# Patient Record
Sex: FEMALE | Race: WHITE | NOT HISPANIC OR LATINO | Employment: STUDENT | URBAN - METROPOLITAN AREA
[De-identification: names, ages, dates, MRNs, and addresses within clinical notes are randomized per-mention and may not be internally consistent; named-entity substitution may affect disease eponyms.]

---
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
## 2021-01-28 ENCOUNTER — TELEMEDICINE (OUTPATIENT)
Dept: FAMILY MEDICINE CLINIC | Facility: CLINIC | Age: 21
End: 2021-01-28

## 2021-01-28 DIAGNOSIS — F43.21 ADJUSTMENT DISORDER WITH DEPRESSED MOOD: ICD-10-CM

## 2021-01-28 DIAGNOSIS — F41.1 GENERALIZED ANXIETY DISORDER: Primary | ICD-10-CM

## 2021-01-28 PROCEDURE — 99214 OFFICE O/P EST MOD 30 MIN: CPT | Performed by: FAMILY MEDICINE

## 2021-01-28 RX ORDER — OMALIZUMAB 150 MG/ML
INJECTION, SOLUTION SUBCUTANEOUS
COMMUNITY
Start: 2021-01-25 | End: 2023-03-29

## 2021-01-28 RX ORDER — VENLAFAXINE HYDROCHLORIDE 75 MG/1
75 CAPSULE, EXTENDED RELEASE ORAL DAILY
Qty: 30 CAPSULE | Refills: 1 | Status: SHIPPED | OUTPATIENT
Start: 2021-01-28 | End: 2021-02-19 | Stop reason: SDUPTHER

## 2021-02-19 DIAGNOSIS — F43.21 ADJUSTMENT DISORDER WITH DEPRESSED MOOD: ICD-10-CM

## 2021-02-19 DIAGNOSIS — F41.1 GENERALIZED ANXIETY DISORDER: ICD-10-CM

## 2021-02-19 RX ORDER — VENLAFAXINE HYDROCHLORIDE 75 MG/1
75 CAPSULE, EXTENDED RELEASE ORAL DAILY
Qty: 30 CAPSULE | Refills: 1 | Status: SHIPPED | OUTPATIENT
Start: 2021-02-19 | End: 2021-02-24 | Stop reason: DRUGHIGH

## 2021-02-24 ENCOUNTER — TELEMEDICINE (OUTPATIENT)
Dept: FAMILY MEDICINE CLINIC | Facility: CLINIC | Age: 21
End: 2021-02-24

## 2021-02-24 DIAGNOSIS — F41.1 GENERALIZED ANXIETY DISORDER: Primary | ICD-10-CM

## 2021-02-24 DIAGNOSIS — F43.21 ADJUSTMENT DISORDER WITH DEPRESSED MOOD: ICD-10-CM

## 2021-02-24 PROCEDURE — 99213 OFFICE O/P EST LOW 20 MIN: CPT | Performed by: FAMILY MEDICINE

## 2021-02-24 RX ORDER — VENLAFAXINE HYDROCHLORIDE 150 MG/1
150 TABLET, EXTENDED RELEASE ORAL DAILY
Qty: 30 EACH | Refills: 1 | Status: SHIPPED | OUTPATIENT
Start: 2021-02-24 | End: 2021-03-22 | Stop reason: SDUPTHER

## 2021-02-24 NOTE — PROGRESS NOTES
This was an audio and video enabled telemedicine encounter.  Length of visit 12 minutes.    Chief Complaint  Med Refill    Subjective          Dania Holliday presents to [facility]  Presents for video visit to follow-up on her anxiety.  She feels that her mood is better on the Effexor.  She feels less blunted.  She denies any side effects.  She may be a little more down but certainly is less anxious.  She denies any suicidal or homicidal ideation.  She denies any hopelessness.  She is able to focus on all her schoolwork.         Objective   Vital Signs:   There were no vitals taken for this visit.    Physical Exam  Vitals signs and nursing note reviewed.   Constitutional:       General: She is not in acute distress.  HENT:      Head: Normocephalic and atraumatic.      Right Ear: External ear normal.      Left Ear: External ear normal.   Eyes:      Conjunctiva/sclera: Conjunctivae normal.      Pupils: Pupils are equal, round, and reactive to light.   Pulmonary:      Effort: No respiratory distress.   Neurological:      Mental Status: She is alert and oriented to person, place, and time.   Psychiatric:         Mood and Affect: Mood normal.         Behavior: Behavior normal.        Result Review :                 Assessment and Plan    Diagnoses and all orders for this visit:    1. Generalized anxiety disorder (Primary)  -     venlafaxine (EFFEXOR) 150 MG tablet sustained-release 24 hour 24 hr tablet; Take 1 tablet by mouth Daily.  Dispense: 30 each; Refill: 1    2. Adjustment disorder with depressed mood  -     venlafaxine (EFFEXOR) 150 MG tablet sustained-release 24 hour 24 hr tablet; Take 1 tablet by mouth Daily.  Dispense: 30 each; Refill: 1        Follow Up   Return in about 2 months (around 4/24/2021) for Recheck mood .  Patient was given instructions and counseling regarding her condition or for health maintenance advice. Please see specific information pulled into the AVS if  appropriate.

## 2021-03-22 DIAGNOSIS — F41.1 GENERALIZED ANXIETY DISORDER: ICD-10-CM

## 2021-03-22 DIAGNOSIS — F43.21 ADJUSTMENT DISORDER WITH DEPRESSED MOOD: ICD-10-CM

## 2021-03-22 RX ORDER — VENLAFAXINE HYDROCHLORIDE 150 MG/1
150 TABLET, EXTENDED RELEASE ORAL DAILY
Qty: 90 EACH | Refills: 1 | Status: SHIPPED | OUTPATIENT
Start: 2021-03-22 | End: 2021-03-29 | Stop reason: SDUPTHER

## 2021-03-29 ENCOUNTER — TELEPHONE (OUTPATIENT)
Dept: FAMILY MEDICINE CLINIC | Facility: CLINIC | Age: 21
End: 2021-03-29

## 2021-03-29 DIAGNOSIS — F41.1 GENERALIZED ANXIETY DISORDER: Primary | ICD-10-CM

## 2021-03-29 RX ORDER — VENLAFAXINE HYDROCHLORIDE 150 MG/1
150 CAPSULE, EXTENDED RELEASE ORAL DAILY
Qty: 90 CAPSULE | Refills: 0 | Status: SHIPPED | OUTPATIENT
Start: 2021-03-29 | End: 2021-06-02

## 2021-03-29 NOTE — TELEPHONE ENCOUNTER
Caller: Dania Holliday    Relationship to patient: Self    Best call back number: [phone]    Patient is needing: PATIENT IS WANTING DR. KEHRER TO SEND IN CAPSULE FORM OF EFFEXOR AS THE PILL FORM IS TOO EXPENSIVE. NEW PRESCRIPTION IS REQUIRED FOR CAPSULES    Preferred Pharmacy: [facility]/pharmacy #[id number] - [place], [place] - [address] [phone] [facility] [phone]   [phone]

## 2021-06-02 ENCOUNTER — OFFICE VISIT (OUTPATIENT)
Dept: FAMILY MEDICINE CLINIC | Facility: CLINIC | Age: 21
End: 2021-06-02

## 2021-06-02 VITALS
WEIGHT: 132.6 LBS | HEART RATE: 93 BPM | BODY MASS INDEX: 22.09 KG/M2 | OXYGEN SATURATION: 99 % | SYSTOLIC BLOOD PRESSURE: 120 MMHG | HEIGHT: 65 IN | TEMPERATURE: 97.3 F | DIASTOLIC BLOOD PRESSURE: 80 MMHG

## 2021-06-02 DIAGNOSIS — F41.1 GENERALIZED ANXIETY DISORDER: Chronic | ICD-10-CM

## 2021-06-02 DIAGNOSIS — Z00.00 ROUTINE HEALTH MAINTENANCE: Primary | ICD-10-CM

## 2021-06-02 PROCEDURE — 99395 PREV VISIT EST AGE 18-39: CPT | Performed by: FAMILY MEDICINE

## 2021-06-02 PROCEDURE — 99213 OFFICE O/P EST LOW 20 MIN: CPT | Performed by: FAMILY MEDICINE

## 2021-06-02 RX ORDER — FLUOXETINE HYDROCHLORIDE 20 MG/1
20 CAPSULE ORAL DAILY
Qty: 30 CAPSULE | Refills: 1 | Status: SHIPPED | OUTPATIENT
Start: 2021-06-02 | End: 2021-06-02

## 2021-06-02 RX ORDER — FLUOXETINE HYDROCHLORIDE 20 MG/1
20 CAPSULE ORAL DAILY
Qty: 30 CAPSULE | Refills: 1 | Status: SHIPPED | OUTPATIENT
Start: 2021-06-02 | End: 2021-07-06 | Stop reason: SDUPTHER

## 2021-06-02 NOTE — PATIENT INSTRUCTIONS
Try to switch to the fluoxetine and stop the venlafaxine when you  the fluoxetine.  If you cannot just make a switch go back on the venlafaxine, and contact me for a lower dose of it to taper.  Strongly consider HPV vaccine series and call the office back to get set up if you decide to  Pap smear start at age 21 and every 3 years if normal.

## 2021-06-02 NOTE — PROGRESS NOTES
Subjective   Dania Holliday is a 20 y.o. female who presents for annual female wellness exam.  Chief Complaint   Patient presents with   • Annual Exam   • Gynecologic Exam   Patient presents for her annual.  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  She is not involved in a romantic Cervilenzation ship but is talking to an older gentleman.  She feels that he is responsible enough to be honest with her about his history and would use protection.  We discussed safe sex practices.  We went over the Gardasil vaccine and patient is still hesitant.  As far as her anxiety, she does not think the venlafaxine helped her as much as the Lexapro but would like to try a different SSRI.  If she takes the venlafaxine late she feels funny.  She denies any panic attacks recently.  She denies any suicidal or homicidal ideation.  Patient does use occasional marijuana.  She does not use any other drugs.  She has rarely consumed alcohol.     Menstrual History: regular  Pregnancy History: G0  Sexual History: not yet   Contraception: na  Hormone Replacement Therapy: na  Diet: vegan, mostly  Exercise: going to gym 5 days a week  Do you feel safe? yes  Have you ever been abused? no    Mammogram: na  Pap Smear: na  Bone Density: na  Colon Cancer Screening: na    Immunization History   Administered Date(s) Administered   • COVID-19 (PFIZER) 01/07/2021, 01/28/2021   • DTaP 5 01/15/2002, 02/26/2002, 04/23/2002, 10/29/2003, 11/08/2005   • Flu Vaccine Split Quad 11/30/2019   • Flulaval/Fluarix/Fluzone Quad 11/30/2019   • Hep A, 2 Dose 01/29/2018, 09/06/2018   • Hep B, Adolescent or Pediatric 2001, 2001, 08/20/2002   • HiB 01/15/2002, 02/26/2002, 04/23/2002, 10/29/2003   • IPV 01/15/2002, 02/26/2002, 01/28/2003, 11/05/2005   • MMR 01/28/2003, 11/08/2005   • Meningococcal B,(Bexsero) 08/17/2020   • Meningococcal MCV4P (Menactra) 10/29/2002, 09/06/2018   • Tdap 10/29/2012   • Varicella 10/29/2002, 05/10/2007       The  following portions of the patient's history were reviewed and updated as appropriate: allergies, current medications, past family history, past medical history, past social history, past surgical history and problem list.    Past Medical History:   Diagnosis Date   • Abscess     HIstory of    • Anxiety    • Consumes a vegan diet     On Vegan or strict vegeterian diet--Abstracted from [employer]   • History of attention deficit disorder    • History of cardiac arrhythmia    • History of ear infections    • Iron deficiency anemia    • Underweight     History of        History reviewed. No pertinent surgical history.    Family History   Problem Relation Age of Onset   • ADD / ADHD Mother    • Migraines Mother    • ADD / ADHD Father    • Anxiety disorder Sister    • Migraines Sister    • No Known Problems Other        Social History     Socioeconomic History   • Marital status: Single     Spouse name: Not on file   • Number of children: Not on file   • Years of education: Not on file   • Highest education level: Not on file   Tobacco Use   • Smoking status: Never Smoker   • Smokeless tobacco: Never Used   Substance and Sexual Activity   • Alcohol use: Defer   • Drug use: Defer         Current Outpatient Medications:   •  Xolair 150 MG/ML solution prefilled syringe injection, , Disp: , Rfl:   •  FLUoxetine (PROzac) 20 MG capsule, Take 1 capsule by mouth Daily., Disp: 30 capsule, Rfl: 1    Review of Systems   Constitutional: Negative for chills, fatigue and fever.   HENT: Negative for congestion, ear pain, rhinorrhea and sore throat.    Eyes: Negative for pain and redness.   Respiratory: Negative for cough, chest tightness and wheezing.    Cardiovascular: Negative for chest pain and leg swelling.   Gastrointestinal: Negative for abdominal pain, constipation, diarrhea, nausea and vomiting.   Endocrine: Negative for polydipsia and polyphagia.   Genitourinary: Negative for dysuria and hematuria.   Musculoskeletal: Negative for  "arthralgias and myalgias.   Skin: Negative for color change and rash.   Allergic/Immunologic: Negative.    Neurological: Negative for dizziness, weakness, light-headedness and headaches.   Hematological: Negative.    Psychiatric/Behavioral: Negative for confusion, dysphoric mood and sleep disturbance.       Objective   Vitals:    06/02/21 1448   BP: 120/80   Pulse: 93   Temp: 97.3 °F (36.3 °C)   SpO2: 99%   Weight: 60.1 kg (132 lb 9.6 oz)   Height: 165.1 cm (65\")     Body mass index is 22.07 kg/m².  Physical Exam  Vitals and nursing note reviewed.   Constitutional:       General: She is not in acute distress.     Appearance: Normal appearance. She is well-developed.   HENT:      Head: Normocephalic and atraumatic.      Right Ear: Tympanic membrane, ear canal and external ear normal.      Left Ear: Tympanic membrane, ear canal and external ear normal.      Nose: Nose normal.      Mouth/Throat:      Mouth: Mucous membranes are moist.      Pharynx: Oropharynx is clear. No oropharyngeal exudate or posterior oropharyngeal erythema.   Eyes:      Conjunctiva/sclera: Conjunctivae normal.      Pupils: Pupils are equal, round, and reactive to light.   Neck:      Thyroid: No thyromegaly.   Cardiovascular:      Rate and Rhythm: Normal rate and regular rhythm.      Heart sounds: No murmur heard.     Pulmonary:      Effort: Pulmonary effort is normal.      Breath sounds: Normal breath sounds. No wheezing.   Abdominal:      General: Abdomen is flat. Bowel sounds are normal. There is no distension.      Palpations: Abdomen is soft. There is no mass.      Tenderness: There is no abdominal tenderness.      Hernia: No hernia is present.   Musculoskeletal:         General: No swelling. Normal range of motion.      Cervical back: Normal range of motion and neck supple.      Right lower leg: No edema.      Left lower leg: No edema.   Lymphadenopathy:      Cervical: No cervical adenopathy.   Skin:     General: Skin is warm and dry.      " Capillary Refill: Capillary refill takes less than 2 seconds.      Findings: No rash.   Neurological:      General: No focal deficit present.      Mental Status: She is alert and oriented to person, place, and time.      Cranial Nerves: No cranial nerve deficit.   Psychiatric:         Mood and Affect: Mood normal.         Behavior: Behavior normal.           Assessment/Plan   Diagnoses and all orders for this visit:    1. Routine health maintenance (Primary)    2. Generalized anxiety disorder  -     Discontinue: FLUoxetine (PROzac) 20 MG capsule; Take 1 capsule by mouth Daily.  Dispense: 30 capsule; Refill: 1  -     FLUoxetine (PROzac) 20 MG capsule; Take 1 capsule by mouth Daily.  Dispense: 30 capsule; Refill: 1               Discussed the importance of maintaining a healthy weight and getting regular exercise.  Educated patient on the benefits of healthy diet.  Advise follow-up annually for wellness exams.    Patient Instructions   Try to switch to the fluoxetine and stop the venlafaxine when you  the fluoxetine.  If you cannot just make a switch go back on the venlafaxine, and contact me for a lower dose of it to taper.  Strongly consider HPV vaccine series and call the office back to get set up if you decide to  Pap smear start at age 21 and every 3 years if normal.                Answers for HPI/ROS submitted by the patient on 6/2/2021  What is the primary reason for your visit?: Physical

## 2021-06-20 DIAGNOSIS — F41.1 GENERALIZED ANXIETY DISORDER: ICD-10-CM

## 2021-06-21 RX ORDER — VENLAFAXINE HYDROCHLORIDE 150 MG/1
CAPSULE, EXTENDED RELEASE ORAL
Qty: 90 CAPSULE | Refills: 0 | OUTPATIENT
Start: 2021-06-21

## 2021-07-06 DIAGNOSIS — F41.1 GENERALIZED ANXIETY DISORDER: Chronic | ICD-10-CM

## 2021-07-06 RX ORDER — FLUOXETINE HYDROCHLORIDE 20 MG/1
20 CAPSULE ORAL DAILY
Qty: 90 CAPSULE | Refills: 0 | Status: SHIPPED | OUTPATIENT
Start: 2021-07-06 | End: 2021-08-05 | Stop reason: SDUPTHER

## 2021-08-05 ENCOUNTER — OFFICE VISIT (OUTPATIENT)
Dept: FAMILY MEDICINE CLINIC | Facility: CLINIC | Age: 21
End: 2021-08-05

## 2021-08-05 VITALS
OXYGEN SATURATION: 99 % | BODY MASS INDEX: 21.86 KG/M2 | TEMPERATURE: 98.9 F | DIASTOLIC BLOOD PRESSURE: 62 MMHG | SYSTOLIC BLOOD PRESSURE: 116 MMHG | WEIGHT: 131.2 LBS | HEART RATE: 82 BPM | HEIGHT: 65 IN

## 2021-08-05 DIAGNOSIS — F41.1 GENERALIZED ANXIETY DISORDER: Primary | ICD-10-CM

## 2021-08-05 DIAGNOSIS — F43.21 ADJUSTMENT DISORDER WITH DEPRESSED MOOD: ICD-10-CM

## 2021-08-05 DIAGNOSIS — Z78.9 VEGETARIAN DIET: ICD-10-CM

## 2021-08-05 DIAGNOSIS — Z23 NEED FOR HPV VACCINE: ICD-10-CM

## 2021-08-05 DIAGNOSIS — R10.13 DYSPEPSIA: ICD-10-CM

## 2021-08-05 PROCEDURE — 90651 9VHPV VACCINE 2/3 DOSE IM: CPT | Performed by: FAMILY MEDICINE

## 2021-08-05 PROCEDURE — 90471 IMMUNIZATION ADMIN: CPT | Performed by: FAMILY MEDICINE

## 2021-08-05 PROCEDURE — 99213 OFFICE O/P EST LOW 20 MIN: CPT | Performed by: FAMILY MEDICINE

## 2021-08-05 RX ORDER — FLUOXETINE HYDROCHLORIDE 20 MG/1
20 CAPSULE ORAL DAILY
Qty: 90 CAPSULE | Refills: 1 | Status: SHIPPED | OUTPATIENT
Start: 2021-08-05 | End: 2021-09-16 | Stop reason: DRUGHIGH

## 2021-08-05 RX ORDER — OMEPRAZOLE 20 MG/1
20 CAPSULE, DELAYED RELEASE ORAL DAILY
COMMUNITY
End: 2021-09-16

## 2021-08-05 NOTE — PROGRESS NOTES
"Chief Complaint  Anxiety, Med Refill, and Abdominal Pain    Subjective          Dania Holliday presents to [facility]  Patient presents for follow-up of her mood.  We switched her from venlafaxine to fluoxetine last time.  Mood is doing OK on the fluoxetine.  No SI/HI.  One panic attack that was caffeine induced.   Has had periodic spells of upset stomach over the years that resolved with Pepcid.  She cannot identify any triggering factors but wanted some blood work checked out.  She is still vegetarian.      Objective   Vital Signs:   /62   Pulse 82   Temp 98.9 °F (37.2 °C)   Ht 165.1 cm (65\")   Wt 59.5 kg (131 lb 3.2 oz)   SpO2 99%   BMI 21.83 kg/m²     Physical Exam  Constitutional:       General: She is not in acute distress.     Appearance: Normal appearance. She is well-developed.   HENT:      Head: Normocephalic and atraumatic.      Right Ear: Tympanic membrane, ear canal and external ear normal.      Left Ear: Tympanic membrane, ear canal and external ear normal.      Mouth/Throat:      Mouth: Mucous membranes are moist.      Pharynx: Oropharynx is clear.   Eyes:      Conjunctiva/sclera: Conjunctivae normal.      Pupils: Pupils are equal, round, and reactive to light.   Neck:      Thyroid: No thyromegaly.   Cardiovascular:      Rate and Rhythm: Normal rate and regular rhythm.      Heart sounds: No murmur heard.     Pulmonary:      Effort: Pulmonary effort is normal.      Breath sounds: Normal breath sounds. No wheezing.   Abdominal:      General: Bowel sounds are normal.      Palpations: Abdomen is soft.      Tenderness: There is no abdominal tenderness.   Musculoskeletal:         General: Normal range of motion.      Cervical back: Neck supple.   Lymphadenopathy:      Cervical: No cervical adenopathy.   Skin:     General: Skin is warm and dry.   Neurological:      Mental Status: She is alert and oriented to person, place, and time.   Psychiatric:         Mood and " Affect: Mood normal.         Behavior: Behavior normal.        Result Review :                 Assessment and Plan    Diagnoses and all orders for this visit:    1. Generalized anxiety disorder (Primary)  -     FLUoxetine (PROzac) 20 MG capsule; Take 1 capsule by mouth Daily.  Dispense: 90 capsule; Refill: 1    2. Adjustment disorder with depressed mood    3. Dyspepsia  -     CBC & Differential  -     Comprehensive Metabolic Panel  -     Amylase  -     Lipase    4. Vegetarian diet  -     Vitamin B12        Follow Up   Return in about 6 months (around 2/5/2022) for Recheck mood.  Patient was given instructions and counseling regarding her condition or for health maintenance advice. Please see specific information pulled into the AVS if appropriate.       Answers for HPI/ROS submitted by the patient on 8/4/2021  Please describe your symptoms.: every 6-7 months my stomach gets really sensitive and i have a hard time eating food/drinking water. for the past couple years I’ve just been going on a round of prilosec and it helps my stomach get better again but i just wanted to make sure it wasn’t a bigger issue  Have you had these symptoms before?: Yes  How long have you been having these symptoms?: Greater than 2 weeks  Please list any medications you are currently taking for this condition.: prilosec  What is the primary reason for your visit?: Other

## 2021-08-05 NOTE — PATIENT INSTRUCTIONS
Follow up pending lab results.   OK to take prilosec as needed.   Let me know if you have to take it consistently.

## 2021-08-06 LAB
ALBUMIN SERPL-MCNC: 4.4 G/DL (ref 3.5–5.2)
ALBUMIN/GLOB SERPL: 1.8 G/DL
ALP SERPL-CCNC: 79 U/L (ref 39–117)
ALT SERPL-CCNC: 12 U/L (ref 1–33)
AMYLASE SERPL-CCNC: 69 U/L (ref 28–100)
AST SERPL-CCNC: 15 U/L (ref 1–32)
BASOPHILS # BLD AUTO: 0.04 10*3/MM3 (ref 0–0.2)
BASOPHILS NFR BLD AUTO: 0.5 % (ref 0–1.5)
BILIRUB SERPL-MCNC: 0.4 MG/DL (ref 0–1.2)
BUN SERPL-MCNC: 8 MG/DL (ref 6–20)
BUN/CREAT SERPL: 10 (ref 7–25)
CALCIUM SERPL-MCNC: 9.9 MG/DL (ref 8.6–10.5)
CHLORIDE SERPL-SCNC: 100 MMOL/L (ref 98–107)
CO2 SERPL-SCNC: 26.7 MMOL/L (ref 22–29)
CREAT SERPL-MCNC: 0.8 MG/DL (ref 0.57–1)
EOSINOPHIL # BLD AUTO: 0.01 10*3/MM3 (ref 0–0.4)
EOSINOPHIL NFR BLD AUTO: 0.1 % (ref 0.3–6.2)
ERYTHROCYTE [DISTWIDTH] IN BLOOD BY AUTOMATED COUNT: 11.4 % (ref 12.3–15.4)
GLOBULIN SER CALC-MCNC: 2.5 GM/DL
GLUCOSE SERPL-MCNC: 86 MG/DL (ref 65–99)
HCT VFR BLD AUTO: 42.9 % (ref 34–46.6)
HGB BLD-MCNC: 14.5 G/DL (ref 12–15.9)
IMM GRANULOCYTES # BLD AUTO: 0.03 10*3/MM3 (ref 0–0.05)
IMM GRANULOCYTES NFR BLD AUTO: 0.4 % (ref 0–0.5)
LIPASE SERPL-CCNC: 33 U/L (ref 13–60)
LYMPHOCYTES # BLD AUTO: 1.28 10*3/MM3 (ref 0.7–3.1)
LYMPHOCYTES NFR BLD AUTO: 16.3 % (ref 19.6–45.3)
MCH RBC QN AUTO: 30.3 PG (ref 26.6–33)
MCHC RBC AUTO-ENTMCNC: 33.8 G/DL (ref 31.5–35.7)
MCV RBC AUTO: 89.6 FL (ref 79–97)
MONOCYTES # BLD AUTO: 0.47 10*3/MM3 (ref 0.1–0.9)
MONOCYTES NFR BLD AUTO: 6 % (ref 5–12)
NEUTROPHILS # BLD AUTO: 6 10*3/MM3 (ref 1.7–7)
NEUTROPHILS NFR BLD AUTO: 76.7 % (ref 42.7–76)
NRBC BLD AUTO-RTO: 0 /100 WBC (ref 0–0.2)
PLATELET # BLD AUTO: 265 10*3/MM3 (ref 140–450)
POTASSIUM SERPL-SCNC: 4.8 MMOL/L (ref 3.5–5.2)
PROT SERPL-MCNC: 6.9 G/DL (ref 6–8.5)
RBC # BLD AUTO: 4.79 10*6/MM3 (ref 3.77–5.28)
SODIUM SERPL-SCNC: 138 MMOL/L (ref 136–145)
VIT B12 SERPL-MCNC: 565 PG/ML (ref 211–946)
WBC # BLD AUTO: 7.83 10*3/MM3 (ref 3.4–10.8)

## 2021-09-16 ENCOUNTER — TELEMEDICINE (OUTPATIENT)
Dept: FAMILY MEDICINE CLINIC | Facility: CLINIC | Age: 21
End: 2021-09-16

## 2021-09-16 DIAGNOSIS — F43.21 ADJUSTMENT DISORDER WITH DEPRESSED MOOD: ICD-10-CM

## 2021-09-16 DIAGNOSIS — F41.1 GENERALIZED ANXIETY DISORDER: Primary | ICD-10-CM

## 2021-09-16 PROCEDURE — 99214 OFFICE O/P EST MOD 30 MIN: CPT | Performed by: FAMILY MEDICINE

## 2021-09-16 RX ORDER — FLUOXETINE HYDROCHLORIDE 40 MG/1
40 CAPSULE ORAL DAILY
Qty: 90 CAPSULE | Refills: 0 | Status: SHIPPED | OUTPATIENT
Start: 2021-09-16 | End: 2021-10-18

## 2021-09-16 NOTE — PATIENT INSTRUCTIONS
Let me know if higher dose causes any problems before your next follow-up.  Please do not stop medication without discussing with me first.

## 2021-09-16 NOTE — PROGRESS NOTES
This was an audio and video enabled telemedicine encounter.    Chief Complaint  Anxiety (medication change) and Depression    Subjective          Dania Holliday presents to [facility]  Patient presents for video visit.  This is a chronic problem with worsening symptoms.  She feels that the fluoxetine is not helping her as well as the Lexapro did.  She was wondering about going back on the Lexapro but she did feel very blunted on it previously.  She has never tried a higher dose of fluoxetine.  She has had some more anxiety and depression lately.  She has been pretty down but denies any suicidal or homicidal ideation.  She has had some anxiety attacks.  She will be looking into some therapy down at school.        Objective   Vital Signs:   There were no vitals taken for this visit.    Physical Exam  Constitutional:       General: She is not in acute distress.     Appearance: Normal appearance. She is well-developed.   HENT:      Head: Normocephalic and atraumatic.      Right Ear: External ear normal.      Left Ear: External ear normal.   Eyes:      Conjunctiva/sclera: Conjunctivae normal.      Pupils: Pupils are equal, round, and reactive to light.   Neck:      Thyroid: No thyromegaly.   Pulmonary:      Effort: Pulmonary effort is normal.   Neurological:      Mental Status: She is alert and oriented to person, place, and time.   Psychiatric:         Mood and Affect: Mood normal.         Behavior: Behavior normal.        Result Review :                 Assessment and Plan    Diagnoses and all orders for this visit:    1. Generalized anxiety disorder (Primary)  -     FLUoxetine (PROzac) 40 MG capsule; Take 1 capsule by mouth Daily.  Dispense: 90 capsule; Refill: 0    2. Adjustment disorder with depressed mood  -     FLUoxetine (PROzac) 40 MG capsule; Take 1 capsule by mouth Daily.  Dispense: 90 capsule; Refill: 0    Worsening general anxiety disorder with depression-increase fluoxetine  and close follow-up, encourage therapy, let me know if symptoms worsen instead of getting better.    Follow Up   Return in about 1 month (around 10/16/2021) for Video visit recheck .  Patient was given instructions and counseling regarding her condition or for health maintenance advice. Please see specific information pulled into the AVS if appropriate.

## 2021-10-18 ENCOUNTER — TELEMEDICINE (OUTPATIENT)
Dept: FAMILY MEDICINE CLINIC | Facility: CLINIC | Age: 21
End: 2021-10-18

## 2021-10-18 DIAGNOSIS — F41.1 GENERALIZED ANXIETY DISORDER: Primary | ICD-10-CM

## 2021-10-18 DIAGNOSIS — F43.21 ADJUSTMENT DISORDER WITH DEPRESSED MOOD: ICD-10-CM

## 2021-10-18 PROCEDURE — 99213 OFFICE O/P EST LOW 20 MIN: CPT | Performed by: FAMILY MEDICINE

## 2021-10-18 RX ORDER — ESCITALOPRAM OXALATE 20 MG/1
20 TABLET ORAL DAILY
Qty: 90 TABLET | Refills: 1 | Status: SHIPPED | OUTPATIENT
Start: 2021-10-18 | End: 2022-04-12

## 2021-10-18 NOTE — PROGRESS NOTES
This was an audio and video enabled telemedicine encounter.  Patient identity was verified and she gave her consent for telemedicine visit.    Chief Complaint  Depression (med check)    Subjective          Dania Holliday presents to [facility]  Patient presents for telehealth visit during the global pandemic.  This visit was conducted over [employer] using her cell phones through the [employer].  Length of face time was 10 minutes.  Follow-up was scheduled to discuss her mood disorder.  She feels that the fluoxetine is not helping as well as the Lexapro.  She has been a little blunted on all the medication she tries so she would like to go back on the Lexapro for the best control of her depression anxiety.  She denies any SI or HI or hopelessness.  She denies any hallucinations or delusions.           Objective   Vital Signs:   There were no vitals taken for this visit.    Physical Exam  Constitutional:       General: She is not in acute distress.     Appearance: Normal appearance. She is well-developed.   HENT:      Head: Normocephalic and atraumatic.      Right Ear: External ear normal.      Left Ear: External ear normal.   Eyes:      Conjunctiva/sclera: Conjunctivae normal.      Pupils: Pupils are equal, round, and reactive to light.   Neck:      Thyroid: No thyromegaly.   Pulmonary:      Effort: Pulmonary effort is normal.   Neurological:      Mental Status: She is alert and oriented to person, place, and time.   Psychiatric:         Mood and Affect: Mood normal.         Behavior: Behavior normal.        Result Review :                 Assessment and Plan    Diagnoses and all orders for this visit:    1. Generalized anxiety disorder (Primary)  -     escitalopram (Lexapro) 20 MG tablet; Take 1 tablet by mouth Daily.  Dispense: 90 tablet; Refill: 1    2. Adjustment disorder with depressed mood  -     escitalopram (Lexapro) 20 MG tablet; Take 1 tablet by mouth Daily.  Dispense: 90 tablet;  Refill: 1        Follow Up   Return in about 3 months (around 1/18/2022) for Recheck mood.  Patient was given instructions and counseling regarding her condition or for health maintenance advice. Please see specific information pulled into the AVS if appropriate.

## 2021-12-21 DIAGNOSIS — F43.21 ADJUSTMENT DISORDER WITH DEPRESSED MOOD: ICD-10-CM

## 2021-12-21 DIAGNOSIS — F41.1 GENERALIZED ANXIETY DISORDER: ICD-10-CM

## 2021-12-21 RX ORDER — FLUOXETINE HYDROCHLORIDE 40 MG/1
CAPSULE ORAL
Qty: 90 CAPSULE | Refills: 0 | OUTPATIENT
Start: 2021-12-21

## 2022-04-12 ENCOUNTER — TELEMEDICINE (OUTPATIENT)
Dept: FAMILY MEDICINE CLINIC | Facility: CLINIC | Age: 22
End: 2022-04-12

## 2022-04-12 DIAGNOSIS — F43.21 ADJUSTMENT DISORDER WITH DEPRESSED MOOD: ICD-10-CM

## 2022-04-12 DIAGNOSIS — F41.1 GENERALIZED ANXIETY DISORDER: Primary | ICD-10-CM

## 2022-04-12 DIAGNOSIS — F41.1 GENERALIZED ANXIETY DISORDER: ICD-10-CM

## 2022-04-12 DIAGNOSIS — R68.82 DECREASED LIBIDO: ICD-10-CM

## 2022-04-12 PROCEDURE — 99213 OFFICE O/P EST LOW 20 MIN: CPT | Performed by: FAMILY MEDICINE

## 2022-04-12 RX ORDER — BUPROPION HYDROCHLORIDE 150 MG/1
150 TABLET ORAL EVERY MORNING
Qty: 90 TABLET | Refills: 0 | Status: SHIPPED | OUTPATIENT
Start: 2022-04-12 | End: 2022-08-17

## 2022-04-12 RX ORDER — ESCITALOPRAM OXALATE 20 MG/1
TABLET ORAL
Qty: 90 TABLET | Refills: 0 | Status: SHIPPED | OUTPATIENT
Start: 2022-04-12 | End: 2022-07-11

## 2022-04-12 NOTE — PROGRESS NOTES
Mode of Visit: Video  Location of patient: home  You have chosen to receive care through a telehealth visit.  The patient has signed the video visit consent form.  The visit included audio and video interaction. No technical issues occurred during this visit.     Chief Complaint  Anxiety and Med Refill (Side effects )    Subjective          Dania Holliday presents to [facility]  History of Present Illness  Objective   Patient presents for follow-up on her mood to discuss the side effect of her medication.  She feels the Lexapro is really helping her anxiety and depression but she has had decreased ability to enjoy intimacy.  She has not become sexually active yet as far as intercourse but has not had the same sexual partner.  She is willing to try medication to help mitigate side effect.  Vital Signs:   There were no vitals taken for this visit.    Physical Exam   Constitutional: She appears well-developed and well-nourished. No distress.   HENT:   Head: Normocephalic and atraumatic.   Eyes: Conjunctivae are normal.   Psychiatric: She has a normal mood and affect.     Result Review :                 Assessment and Plan    Diagnoses and all orders for this visit:    1. Generalized anxiety disorder (Primary)    2. Adjustment disorder with depressed mood    3. Decreased libido    Other orders  -     buPROPion XL (Wellbutrin XL) 150 MG 24 hr tablet; Take 1 tablet by mouth Every Morning.  Dispense: 90 tablet; Refill: 0    Since mood doing well, will continue Lexapro and do a trial of Wellbutrin.  Patient will message me in a few weeks let me know if it is helping.  Follow-up this summer with a physical.    Follow Up   Return in about 3 months (around 7/12/2022) for Annual physical.  Patient was given instructions and counseling regarding her condition or for health maintenance advice. Please see specific information pulled into the AVS if appropriate.

## 2022-04-12 NOTE — TELEPHONE ENCOUNTER
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 Patient stated that was fine, but she still wanted to see Dr. Kehrer for med concern.

## 2022-06-02 ENCOUNTER — TELEMEDICINE (OUTPATIENT)
Dept: FAMILY MEDICINE CLINIC | Facility: CLINIC | Age: 22
End: 2022-06-02

## 2022-06-02 DIAGNOSIS — U07.1 UPPER RESPIRATORY TRACT INFECTION DUE TO COVID-19 VIRUS: Primary | ICD-10-CM

## 2022-06-02 DIAGNOSIS — J06.9 UPPER RESPIRATORY TRACT INFECTION DUE TO COVID-19 VIRUS: Primary | ICD-10-CM

## 2022-06-02 PROCEDURE — 99213 OFFICE O/P EST LOW 20 MIN: CPT | Performed by: FAMILY MEDICINE

## 2022-06-02 NOTE — PROGRESS NOTES
Chief Complaint  Covid-19 Home Monitoring Video Visit (Sore throat fatigue dizziness )    Subjective         Dania Holliday presents to [facility]  Patient of Dr. Kehrer's has requested a video visit during the coronavirus pandemic to discuss some new symptoms she is having.  Patient started 4 days ago with sore throat, fatigue, chest congestion, and cough.  She did a home COVID test yesterday and it was positive.  Patient denies any chest pain or shortness of breath.  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  Her symptoms are not get any worse but they have not started improving yet either.  Patient denies a history of immunocompromise.    Objective   Vital Signs:   There were no vitals taken for this visit.    Physical Exam   Constitutional: She appears well-developed and well-nourished.   HENT:   Head: Normocephalic and atraumatic.   Eyes: No scleral icterus.   Neck: Neck normal appearance.  Pulmonary/Chest: Effort normal.   Neurological: She is alert.   Psychiatric: She has a normal mood and affect.     Result Review :   The following data was reviewed by: Cinthya Spencer DO on 06/02/2022:  Common labs    Common Labsle 8/5/21 8/5/21    1108 1108   Glucose  86   BUN  8   Creatinine  0.80   eGFR Non  Am  92   eGFR African Am  112   Sodium  138   Potassium  4.8   Chloride  100   Calcium  9.9   Total Protein  6.9   Albumin  4.40   Total Bilirubin  0.4   Alkaline Phosphatase  79   AST (SGOT)  15   ALT (SGPT)  12   WBC 7.83    Hemoglobin 14.5    Hematocrit 42.9    Platelets 265       Comments are available for some flowsheets but are not being displayed.                                 Assessment and Plan    Diagnoses and all orders for this visit:    1. Upper respiratory tract infection due to COVID-19 virus (Primary)    Patient requested a video visit for COVID-19 upper respiratory infection.  Patient states her symptoms are mild and  she can use over-the-counter medications to treat her symptoms.  She agrees to drink plenty of water and follow the CDC guidelines of isolation.  Patient will monitor her symptoms closely and follow-up if worsening.  I spent 20 minutes caring for Dania on this date of service. This time includes time spent by me in the following activities:performing a medically appropriate examination and/or evaluation , counseling and educating the patient/family/caregiver and documenting information in the medical record  Follow Up   No follow-ups on file.  Patient was given instructions and counseling regarding her condition or for health maintenance advice. Please see specific information pulled into the AVS if appropriate.     Mode of Visit: Video  Location of patient: home  You have chosen to receive care through a telehealth visit.  The patient has signed the video visit consent form.  The visit included audio and video interaction. No technical issues occurred during this visit.

## 2022-06-10 ENCOUNTER — OFFICE VISIT (OUTPATIENT)
Dept: FAMILY MEDICINE CLINIC | Facility: CLINIC | Age: 22
End: 2022-06-10

## 2022-06-10 VITALS
HEIGHT: 65 IN | DIASTOLIC BLOOD PRESSURE: 54 MMHG | OXYGEN SATURATION: 98 % | HEART RATE: 95 BPM | BODY MASS INDEX: 21.02 KG/M2 | TEMPERATURE: 97.6 F | SYSTOLIC BLOOD PRESSURE: 108 MMHG | WEIGHT: 126.2 LBS

## 2022-06-10 DIAGNOSIS — Z23 NEED FOR HPV VACCINE: ICD-10-CM

## 2022-06-10 DIAGNOSIS — F43.21 ADJUSTMENT DISORDER WITH DEPRESSED MOOD: ICD-10-CM

## 2022-06-10 DIAGNOSIS — Z30.013 INITIATION OF DEPO PROVERA: ICD-10-CM

## 2022-06-10 DIAGNOSIS — Z30.09 OTHER GENERAL COUNSELING AND ADVICE FOR CONTRACEPTIVE MANAGEMENT: ICD-10-CM

## 2022-06-10 DIAGNOSIS — Z00.00 ROUTINE HEALTH MAINTENANCE: Primary | ICD-10-CM

## 2022-06-10 DIAGNOSIS — F41.1 GENERALIZED ANXIETY DISORDER: ICD-10-CM

## 2022-06-10 LAB
B-HCG UR QL: NEGATIVE
EXPIRATION DATE: NORMAL
INTERNAL NEGATIVE CONTROL: NEGATIVE
INTERNAL POSITIVE CONTROL: POSITIVE
Lab: NORMAL

## 2022-06-10 PROCEDURE — 90651 9VHPV VACCINE 2/3 DOSE IM: CPT | Performed by: FAMILY MEDICINE

## 2022-06-10 PROCEDURE — 90471 IMMUNIZATION ADMIN: CPT | Performed by: FAMILY MEDICINE

## 2022-06-10 PROCEDURE — 81025 URINE PREGNANCY TEST: CPT | Performed by: FAMILY MEDICINE

## 2022-06-10 PROCEDURE — 99395 PREV VISIT EST AGE 18-39: CPT | Performed by: FAMILY MEDICINE

## 2022-06-10 RX ORDER — MEDROXYPROGESTERONE ACETATE 150 MG/ML
150 INJECTION, SUSPENSION INTRAMUSCULAR ONCE
Status: COMPLETED | OUTPATIENT
Start: 2022-06-10 | End: 2022-12-09

## 2022-06-10 NOTE — PROGRESS NOTES
Subjective   Dania Holliday is a 21 y.o. female who presents for annual female wellness exam.  Chief Complaint   Patient presents with   • Annual Exam   • Anxiety   • Contraception     Just getting over Covid.  Wants to talk about contraception.   Doing well with her mood.  No side effects.    Menstrual History: Regular  Pregnancy History:   Sexual History: 1 LTP   Contraception: Condoms  Hormone Replacement Therapy: NA  Diet: mostly plant based  Exercise: some  Do you feel safe? yes  Have you ever been abused? No  Up to date with vision and dental.    Mammogram: na  Pap Smear: due next year  Bone Density: na  Colon Cancer Screening: na    Immunization History   Administered Date(s) Administered   • COVID-19 (PFIZER) PURPLE CAP 2021, 2021, 2021   • DTaP 5 01/15/2002, 2002, 2002, 10/29/2003, 2005   • Flu Vaccine Split Quad 2019   • FluLaval/Fluarix/Fluzone >6 2019   • Hep A, 2 Dose 2018, 2018   • Hep B, Adolescent or Pediatric 2001, 2001, 2002   • HiB 01/15/2002, 2002, 2002, 10/29/2003   • Hpv9 2021, 06/10/2022   • IPV 01/15/2002, 2002, 2003, 2005   • MMR 2003, 2005   • Meningococcal B,(Bexsero) 2020   • Meningococcal MCV4P (Menactra) 10/29/2002, 2018   • Tdap 10/29/2012   • Varicella 10/29/2002, 05/10/2007       The following portions of the patient's history were reviewed and updated as appropriate: allergies, current medications, past family history, past medical history, past social history, past surgical history and problem list.    Past Medical History:   Diagnosis Date   • Abscess     HIstory of    • Anxiety    • Consumes a vegan diet     On Vegan or strict vegeterian diet--Abstracted from [employer]   • Depression 2019    developed after untreated anxiety   • History of attention deficit disorder    • History of cardiac arrhythmia    • History of ear infections   "  • Iron deficiency anemia    • Underweight     History of        History reviewed. No pertinent surgical history.    Family History   Problem Relation Age of Onset   • ADD / ADHD Mother    • Migraines Mother    • ADD / ADHD Father    • Other Father         ADHD   • Anxiety disorder Sister    • Migraines Sister    • No Known Problems Other        Social History     Socioeconomic History   • Marital status: Single   Tobacco Use   • Smoking status: Never Smoker   • Smokeless tobacco: Never Used   Substance and Sexual Activity   • Alcohol use: Yes     Alcohol/week: 1.0 standard drink     Types: 1 Glasses of wine per week   • Drug use: Never   • Sexual activity: Never         Current Outpatient Medications:   •  escitalopram (LEXAPRO) 20 MG tablet, TAKE 1 TABLET BY MOUTH EVERY DAY (Patient taking differently: 10 mg.), Disp: 90 tablet, Rfl: 0  •  Xolair 150 MG/ML solution prefilled syringe injection, , Disp: , Rfl:   •  buPROPion XL (Wellbutrin XL) 150 MG 24 hr tablet, Take 1 tablet by mouth Every Morning., Disp: 90 tablet, Rfl: 0    Current Facility-Administered Medications:   •  MedroxyPROGESTERone Acetate (DEPO-PROVERA) injection 150 mg, 150 mg, Intramuscular, Once, Kehrer, Meredith Lea, MD    Review of Systems    Objective   Vitals:    06/10/22 1501   BP: 108/54   Pulse: 95   Temp: 97.6 °F (36.4 °C)   SpO2: 98%   Weight: 57.2 kg (126 lb 3.2 oz)   Height: 165.1 cm (65\")     Body mass index is 21 kg/m².  Physical Exam  Constitutional:       General: She is not in acute distress.     Appearance: Normal appearance. She is well-developed.   HENT:      Head: Normocephalic and atraumatic.      Right Ear: Tympanic membrane, ear canal and external ear normal.      Left Ear: Tympanic membrane, ear canal and external ear normal.      Mouth/Throat:      Mouth: Mucous membranes are moist.      Pharynx: Oropharynx is clear.   Eyes:      Conjunctiva/sclera: Conjunctivae normal.      Pupils: Pupils are equal, round, and reactive to " light.   Neck:      Thyroid: No thyromegaly.   Cardiovascular:      Rate and Rhythm: Normal rate and regular rhythm.      Heart sounds: No murmur heard.  Pulmonary:      Effort: Pulmonary effort is normal.      Breath sounds: Normal breath sounds. No wheezing.   Abdominal:      General: Bowel sounds are normal.      Palpations: Abdomen is soft.      Tenderness: There is no abdominal tenderness.   Musculoskeletal:         General: Normal range of motion.      Cervical back: Neck supple.   Lymphadenopathy:      Cervical: No cervical adenopathy.   Skin:     General: Skin is warm and dry.   Neurological:      Mental Status: She is alert and oriented to person, place, and time.   Psychiatric:         Mood and Affect: Mood normal.         Behavior: Behavior normal.           Assessment & Plan   Diagnoses and all orders for this visit:    1. Routine health maintenance (Primary)  -     Chlamydia trachomatis, Neisseria gonorrhoeae, PCR - Urine, Urine, Clean Catch  -     HPV 9-Valent Recomb Vaccine suspension 0.5 mL    2. Generalized anxiety disorder    3. Adjustment disorder with depressed mood    4. Other general counseling and advice for contraceptive management    5. Initiation of Depo Provera  -     POCT pregnancy, urine  -     MedroxyPROGESTERone Acetate (DEPO-PROVERA) injection 150 mg    6. Need for HPV vaccine  -     HPV 9-Valent Recomb Vaccine suspension 0.5 mL    Follow-up with me in 3 months to see how you are doing with the Depo-Provera.  Let me know if any side effects.  If you decide to do a copper IUD will do a referral to GYN.           Discussed the importance of maintaining a healthy weight and getting regular exercise.  Educated patient on the benefits of healthy diet.  Advise follow-up annually for wellness exams.    There are no Patient Instructions on file for this visit.

## 2022-06-13 LAB
C TRACH RRNA SPEC QL NAA+PROBE: NEGATIVE
N GONORRHOEA RRNA SPEC QL NAA+PROBE: NEGATIVE

## 2022-07-09 DIAGNOSIS — F41.1 GENERALIZED ANXIETY DISORDER: ICD-10-CM

## 2022-07-09 DIAGNOSIS — F43.21 ADJUSTMENT DISORDER WITH DEPRESSED MOOD: ICD-10-CM

## 2022-07-11 RX ORDER — ESCITALOPRAM OXALATE 20 MG/1
TABLET ORAL
Qty: 90 TABLET | Refills: 0 | Status: SHIPPED | OUTPATIENT
Start: 2022-07-11 | End: 2022-08-17 | Stop reason: DRUGHIGH

## 2022-08-17 ENCOUNTER — OFFICE VISIT (OUTPATIENT)
Dept: FAMILY MEDICINE CLINIC | Facility: CLINIC | Age: 22
End: 2022-08-17

## 2022-08-17 VITALS
TEMPERATURE: 95 F | SYSTOLIC BLOOD PRESSURE: 116 MMHG | OXYGEN SATURATION: 98 % | BODY MASS INDEX: 21.16 KG/M2 | WEIGHT: 127 LBS | HEART RATE: 82 BPM | HEIGHT: 65 IN | DIASTOLIC BLOOD PRESSURE: 62 MMHG

## 2022-08-17 DIAGNOSIS — R30.0 DYSURIA: Primary | ICD-10-CM

## 2022-08-17 DIAGNOSIS — K64.9 HEMORRHOIDS, UNSPECIFIED HEMORRHOID TYPE: ICD-10-CM

## 2022-08-17 LAB
BILIRUB BLD-MCNC: NEGATIVE MG/DL
CLARITY, POC: CLEAR
COLOR UR: YELLOW
EXPIRATION DATE: ABNORMAL
GLUCOSE UR STRIP-MCNC: NEGATIVE MG/DL
KETONES UR QL: NEGATIVE
LEUKOCYTE EST, POC: NEGATIVE
Lab: ABNORMAL
NITRITE UR-MCNC: NEGATIVE MG/ML
PH UR: 6 [PH] (ref 5–8)
PROT UR STRIP-MCNC: NEGATIVE MG/DL
RBC # UR STRIP: ABNORMAL /UL
SP GR UR: 1.01 (ref 1–1.03)
UROBILINOGEN UR QL: NORMAL

## 2022-08-17 PROCEDURE — 99213 OFFICE O/P EST LOW 20 MIN: CPT | Performed by: FAMILY MEDICINE

## 2022-08-17 PROCEDURE — 81003 URINALYSIS AUTO W/O SCOPE: CPT | Performed by: FAMILY MEDICINE

## 2022-08-17 RX ORDER — ESCITALOPRAM OXALATE 10 MG/1
TABLET ORAL
COMMUNITY
End: 2022-11-01 | Stop reason: SDUPTHER

## 2022-08-17 RX ORDER — HYDROCORTISONE ACETATE PRAMOXINE HCL 2.5; 1 G/100G; G/100G
CREAM TOPICAL 3 TIMES DAILY PRN
Qty: 30 G | Refills: 0 | Status: SHIPPED | OUTPATIENT
Start: 2022-08-17 | End: 2023-01-04

## 2022-08-17 NOTE — PROGRESS NOTES
"Chief Complaint  Difficulty Urinating    Subjective        Dania Holliday presents to [facility]  History of Present Illness     The patient presents today for evaluation of vaginal burning.    Vaginal burning  The patient reports she woke up the last couple of mornings with burning in her vaginal area for approximately 1 hour. She mentions it was only the first couple of times she urinated in the morning. She admits the rest of the day she was fine. The patient reports she had a pH strip test and the pH was normal in her vagina. She mentions she took 2 UTI tests and the nitrates were negative and the leukocytes were negative, but the blood was positive +3 . The patient denies any frequency of urination. She reports she has been taking showers. The patient denies any discharge.    Hemorrhoids   The patient reports she has had recurring hemorrhoids since her freshman year of college. She mentions she gets nervous in new places and can not have a bowel movement when she goes there. The patient reports she went to the doctor there and she recommended stool softeners. She mentions she did that off and on for the past couple of years. The patient reports she has been doubling down with magnesium citrate twice a day and fiber at night, which has helped a lot. She mentions she has used CeraVe healing ointment in the past week. The patient reports she has bad perineal tears. She mentions she has been spotting for the past 2 weeks. The patient denies any frequency of urination. She reports she has been taking showers. The patient denies any discharge.    Objective   Vital Signs:  /62   Pulse 82   Temp 95 °F (35 °C)   Ht 165.1 cm (65\")   Wt 57.6 kg (127 lb)   SpO2 98%   BMI 21.13 kg/m²   Estimated body mass index is 21.13 kg/m² as calculated from the following:    Height as of this encounter: 165.1 cm (65\").    Weight as of this encounter: 57.6 kg (127 lb).    BMI is within normal " parameters. No other follow-up for BMI required.      Physical Exam  Constitutional:       General: She is not in acute distress.     Appearance: Normal appearance. She is well-developed.   HENT:      Head: Normocephalic and atraumatic.      Right Ear: External ear normal.      Left Ear: External ear normal.   Eyes:      Conjunctiva/sclera: Conjunctivae normal.      Pupils: Pupils are equal, round, and reactive to light.   Neck:      Thyroid: No thyromegaly.   Pulmonary:      Effort: Pulmonary effort is normal.   Genitourinary:     Comments: No suprapubic tenderness.  Neurological:      Mental Status: She is alert and oriented to person, place, and time.   Psychiatric:         Mood and Affect: Mood normal.         Behavior: Behavior normal.        Result Review :  The following data was reviewed by: Meredith Lea Kehrer, MD on 08/17/2022:  UA    Urinalysis 8/17/22   Ketones, UA Negative   Leukocytes, UA Negative                     Assessment and Plan   Diagnoses and all orders for this visit:    1. Dysuria (Primary)  -     POC Urinalysis Dipstick, Automated  -     Urine Culture - Urine, Urine, Clean Catch    2. Hemorrhoids, unspecified hemorrhoid type  -     Hydrocort-Pramoxine, Perianal, (Analpram HC) 2.5-1 % rectal cream; Insert  into the rectum 3 (Three) Times a Day As Needed for Hemorrhoids.  Dispense: 30 g; Refill: 0      1. Dysuria (Primary)  -  POCT Urinalysis Dipstick, Automated  -  Urine Culture - Urine, Urine, Clean Catch  - Space try using a peribttle    2. Hemorrhoids, unspecified hemorrhoid type  -  Hydrocort-Pramoxine, Perianal, (Analpram HC) 2.5-1 % rectal cream; Insert  into the rectum 3 times a day as needed for hemorrhoids.  Dispense: 30 g; Refill: 0         Follow Up   No follow-ups on file.  Patient was given instructions and counseling regarding her condition or for health maintenance advice. Please see specific information pulled into the AVS if appropriate.         Transcribed from ambient  dictation for Meredith Lea Kehrer, MD by DIANE GRIFFIN.  08/17/22   12:03 EDT    Patient verbalized consent to the visit recording.  I have personally performed the services described in this document as transcribed by the above individual, and it is both accurate and complete.  Meredith Lea Kehrer, MD  8/17/2022  12:57 EDT

## 2022-08-19 LAB
BACTERIA UR CULT: NORMAL
BACTERIA UR CULT: NORMAL

## 2022-09-09 ENCOUNTER — TELEPHONE (OUTPATIENT)
Dept: FAMILY MEDICINE CLINIC | Facility: CLINIC | Age: 22
End: 2022-09-09

## 2022-09-09 ENCOUNTER — CLINICAL SUPPORT (OUTPATIENT)
Dept: FAMILY MEDICINE CLINIC | Facility: CLINIC | Age: 22
End: 2022-09-09

## 2022-09-09 DIAGNOSIS — Z30.9 ENCOUNTER FOR CONTRACEPTIVE MANAGEMENT, UNSPECIFIED TYPE: Primary | ICD-10-CM

## 2022-09-09 PROCEDURE — 96372 THER/PROPH/DIAG INJ SC/IM: CPT | Performed by: FAMILY MEDICINE

## 2022-09-09 RX ORDER — MEDROXYPROGESTERONE ACETATE 150 MG/ML
150 INJECTION, SUSPENSION INTRAMUSCULAR ONCE
Status: COMPLETED | OUTPATIENT
Start: 2022-09-09 | End: 2022-09-09

## 2022-09-09 RX ADMIN — MEDROXYPROGESTERONE ACETATE 150 MG: 150 INJECTION, SUSPENSION INTRAMUSCULAR at 16:09

## 2022-09-09 NOTE — TELEPHONE ENCOUNTER
PT CAME IN FOR HER DEPO PROVERA SHOT.  SHE THOUGHT SHE WAS GOING TO BE GETTING THE NEXPLANON.  SHE DID GO AHEAD WITH THE INJECTION.  SHE IS STATING THAT YOU GUYS DISCUSSED THE IMPLANT AT HER LAST APPOINTMENT AND SHE IS WONDERING WHEN AND IF SHE CAN GET THAT STARTED.  INFORMED PT THAT IT IS A PROCESS AND SHE IS FINE WITH THAT.  INFORMED HER THAT IT COULD NOT BE IMPLANTED UNTIL HER 12 WEEKS IS UP FOR THE DEPO.  PLEASE ADVISE IS PT NEEDS TO BE SEEN TO DISCUSS THIS OR IF WE CAN START THE PROCESS FOR IT.

## 2022-09-12 ENCOUNTER — TELEPHONE (OUTPATIENT)
Dept: FAMILY MEDICINE CLINIC | Facility: CLINIC | Age: 22
End: 2022-09-12

## 2022-09-12 NOTE — TELEPHONE ENCOUNTER
I left  for pt advising her she needs to call her insurance company and find out which speciality pharmacy they prefer.  Also informed pt that she needs to come in and sign Nexplanon forms before they can be faxed to [employer]

## 2022-09-12 NOTE — TELEPHONE ENCOUNTER
At her physical, we discussed her letting me know if she is doing okay with the Depo.  If she was, I would order the Nexplanon if she want to have it implanted.  She was concerned about mood effects with progesterone only birth control since she did not do well with a combined OCPs.  If she is doing well with the Depo, please go ahead and order Nexplanon.

## 2022-09-13 NOTE — TELEPHONE ENCOUNTER
Left VM for pt advising her she needs to call her insurance company and find out what specialty pharmacy they prefer.  Also notified pt that she needs to come by the office and sign nexplanon papers so we can fax them to order her nexplanon

## 2022-10-25 RX ORDER — ESCITALOPRAM OXALATE 10 MG/1
TABLET ORAL
OUTPATIENT
Start: 2022-10-25

## 2022-10-25 RX ORDER — ESCITALOPRAM OXALATE 10 MG/1
10 TABLET ORAL DAILY
Qty: 30 TABLET | Refills: 0 | Status: CANCELLED | OUTPATIENT
Start: 2022-10-25

## 2022-11-01 RX ORDER — ESCITALOPRAM OXALATE 10 MG/1
10 TABLET ORAL DAILY
Qty: 30 TABLET | Refills: 0 | Status: SHIPPED | OUTPATIENT
Start: 2022-11-01 | End: 2022-11-23 | Stop reason: SDUPTHER

## 2022-11-01 NOTE — TELEPHONE ENCOUNTER
Caller: Dania Holliday    Relationship: Self    Best call back number:[phone]    Requested Prescriptions:   Requested Prescriptions     Pending Prescriptions Disp Refills   • escitalopram (LEXAPRO) 10 MG tablet          Pharmacy where request should be sent: [facility] #[id number] - [place], [place] - [address] [phone] [facility] [phone]      Additional details provided by patient: PREVIOUS REFILL DENIED ON GROUNDS OF NEEDING APPOINTMENT, SCHEDULED FIRST AVAILABLE MYCHART VISIT FOR THE 9TH.     Does the patient have less than a 3 day supply:  [x] Yes  [] No    Jaskaran Rouse Rep   11/01/22 13:30 EDT

## 2022-11-09 ENCOUNTER — TELEMEDICINE (OUTPATIENT)
Dept: FAMILY MEDICINE CLINIC | Facility: CLINIC | Age: 22
End: 2022-11-09

## 2022-11-09 DIAGNOSIS — R11.0 NAUSEA: ICD-10-CM

## 2022-11-09 DIAGNOSIS — F41.1 GENERALIZED ANXIETY DISORDER: Primary | ICD-10-CM

## 2022-11-09 DIAGNOSIS — R10.13 EPIGASTRIC PAIN: ICD-10-CM

## 2022-11-09 PROCEDURE — 99214 OFFICE O/P EST MOD 30 MIN: CPT | Performed by: FAMILY MEDICINE

## 2022-11-09 NOTE — PROGRESS NOTES
Chief Complaint  Med Refill, Anxiety, and wants gi referral     Subjective         Dania Holliday presents to [facility]  History of Present Illness  Patient presents to follow up for her anxiety.  Still doing well on her medications.  No panic attacks.  Handling stressors.    Has been concerned bout her stomach.  First had trouble years ago after a trip.  Has been taking prilosec at times.  Meat can be a trigger.  Needs to eat mild foods.  Gets dizzy after she eats.  Can not eat greasy foods.  No bowel change but does get urgency.  Can break out in a sweat.       Objective   Vital Signs:   There were no vitals taken for this visit.    Physical Exam   Constitutional: She appears well-developed and well-nourished. No distress.   HENT:   Head: Normocephalic and atraumatic.   Eyes: Conjunctivae are normal.   Psychiatric: She has a normal mood and affect.     Result Review :                 Assessment and Plan    Diagnoses and all orders for this visit:    1. Generalized anxiety disorder (Primary)    2. Epigastric pain  -     US Gallbladder; Future  -     Amylase; Future  -     Lipase; Future  -     CBC & Differential; Future  -     Comprehensive Metabolic Panel; Future    3. Nausea  -     US Gallbladder; Future  -     Amylase; Future  -     Lipase; Future  -     CBC & Differential; Future  -     Comprehensive Metabolic Panel; Future    Epigastric pain and nausea associated with fecal urgency and certain food intolerance-we will go ahead and rule out gallbladder disease and check labs when patient is up from school for Thanksgiving.  Follow-up pending test results.  General anxiety-doing well, continue current medication.    Follow Up   No follow-ups on file.  Patient was given instructions and counseling regarding her condition or for health maintenance advice. Please see specific information pulled into the AVS if appropriate.     Mode of Visit: Video  Location of patient:  home  Location of provider: [facility] [place] [facility]  You have chosen to receive care through a telehealth visit.  Does the patient consent to use a video/audio connection for your medical care today? Yes  The visit included audio and video interaction. No technical issues occurred during this visit.

## 2022-11-23 ENCOUNTER — HOSPITAL ENCOUNTER (OUTPATIENT)
Dept: ULTRASOUND IMAGING | Facility: HOSPITAL | Age: 22
Discharge: HOME | End: 2022-11-23
Admitting: FAMILY MEDICINE

## 2022-11-23 DIAGNOSIS — R10.13 EPIGASTRIC PAIN: ICD-10-CM

## 2022-11-23 DIAGNOSIS — R11.0 NAUSEA: ICD-10-CM

## 2022-11-23 PROCEDURE — 76705 ECHO EXAM OF ABDOMEN: CPT

## 2022-11-23 RX ORDER — ESCITALOPRAM OXALATE 10 MG/1
10 TABLET ORAL DAILY
Qty: 90 TABLET | Refills: 0 | Status: SHIPPED | OUTPATIENT
Start: 2022-11-23 | End: 2023-03-30 | Stop reason: SDUPTHER

## 2022-12-09 ENCOUNTER — CLINICAL SUPPORT (OUTPATIENT)
Dept: FAMILY MEDICINE CLINIC | Facility: CLINIC | Age: 22
End: 2022-12-09

## 2022-12-09 DIAGNOSIS — Z30.42 DEPOT CONTRACEPTION: ICD-10-CM

## 2022-12-09 PROCEDURE — 96372 THER/PROPH/DIAG INJ SC/IM: CPT | Performed by: FAMILY MEDICINE

## 2022-12-09 RX ADMIN — MEDROXYPROGESTERONE ACETATE 150 MG: 150 INJECTION, SUSPENSION INTRAMUSCULAR at 15:51

## 2023-01-04 ENCOUNTER — OFFICE VISIT (OUTPATIENT)
Dept: FAMILY MEDICINE CLINIC | Facility: CLINIC | Age: 23
End: 2023-01-04
Payer: COMMERCIAL

## 2023-01-04 VITALS
SYSTOLIC BLOOD PRESSURE: 108 MMHG | HEIGHT: 65 IN | WEIGHT: 143.2 LBS | HEART RATE: 72 BPM | OXYGEN SATURATION: 100 % | BODY MASS INDEX: 23.86 KG/M2 | TEMPERATURE: 98.4 F | DIASTOLIC BLOOD PRESSURE: 54 MMHG

## 2023-01-04 DIAGNOSIS — F43.21 ADJUSTMENT DISORDER WITH DEPRESSED MOOD: ICD-10-CM

## 2023-01-04 DIAGNOSIS — Z01.84 IMMUNITY STATUS TESTING: Primary | ICD-10-CM

## 2023-01-04 DIAGNOSIS — F41.1 GENERALIZED ANXIETY DISORDER: ICD-10-CM

## 2023-01-04 PROCEDURE — 99213 OFFICE O/P EST LOW 20 MIN: CPT | Performed by: FAMILY MEDICINE

## 2023-01-04 RX ORDER — MEDROXYPROGESTERONE ACETATE 150 MG/ML
150 INJECTION, SUSPENSION INTRAMUSCULAR
COMMUNITY
End: 2023-03-29

## 2023-01-04 NOTE — PROGRESS NOTES
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  She has no history of any communicable diseases.  Her anxiety with depression is doing very well on her Lexapro.  She needs to have some blood work done for titers as well.      Objective   Vital Signs:  /54   Pulse 72   Temp 98.4 °F (36.9 °C)   Ht 165.1 cm (65\")   Wt 65 kg (143 lb 3.2 oz)   SpO2 100%   BMI 23.83 kg/m²   Estimated body mass index is 23.83 kg/m² as calculated from the following:    Height as of this encounter: 165.1 cm (65\").    Weight as of this encounter: 65 kg (143 lb 3.2 oz).    BMI is within normal parameters. No other follow-up for BMI required.      Physical Exam  Constitutional:       General: She is not in acute distress.     Appearance: Normal appearance. She is well-developed.   HENT:      Head: Normocephalic and atraumatic.      Right Ear: Tympanic membrane, ear canal and external ear normal.      Left Ear: Tympanic membrane, ear canal and external ear normal.      Mouth/Throat:      Mouth: Mucous membranes are moist.      Pharynx: Oropharynx is clear.   Eyes:      Conjunctiva/sclera: Conjunctivae normal.      Pupils: Pupils are equal, round, and reactive to light.   Neck:      Thyroid: No thyromegaly.   Cardiovascular:      Rate and Rhythm: Normal rate and regular rhythm.      Heart sounds: No murmur heard.  Pulmonary:      Effort: Pulmonary effort is normal.      Breath sounds: Normal breath sounds. No wheezing.   Abdominal:      General: Bowel sounds are normal.      Palpations: Abdomen is soft.      Tenderness: There is no abdominal tenderness.   Musculoskeletal:         General: Normal range of motion.      Cervical back: Neck supple.   Lymphadenopathy:      Cervical: No cervical adenopathy.   Skin:     General: Skin is warm and dry.    Neurological:      Mental Status: She is alert and oriented to person, place, and time.   Psychiatric:         Mood and Affect: Mood normal.         Behavior: Behavior normal.        Result Review :                Assessment and Plan   Diagnoses and all orders for this visit:    1. Immunity status testing (Primary)  -     Measles / Mumps / Rubella Immunity  -     Varicella zoster antibody, IgG  -     Hepatitis B Surf Antibody Quant    2. Generalized anxiety disorder    3. Adjustment disorder with depressed mood    See school form.  Await titers.  Continue current medication.  Discussed with patient about her weight gain and how it should level out with the Depo.  Cut back on sugar and increase exercise.         Follow Up   No follow-ups on file.  Patient was given instructions and counseling regarding her condition or for health maintenance advice. Please see specific information pulled into the AVS if appropriate.       Answers for HPI/ROS submitted by the patient on 1/2/2023  Please describe your symptoms.: No symptoms, just a physical and immune status with titers for nursing school.  Have you had these symptoms before?: No  How long have you been having these symptoms?: 1-4 days  What is the primary reason for your visit?: Other

## 2023-01-05 LAB
HBV SURFACE AB SER-ACNC: <3.1 MIU/ML
MEV IGG SER IA-ACNC: 78.2 AU/ML
MUV IGG SER IA-ACNC: 14.5 AU/ML
RUBV IGG SERPL IA-ACNC: 6.42 INDEX
VZV IGG SER IA-ACNC: 194 INDEX

## 2023-01-11 ENCOUNTER — CLINICAL SUPPORT (OUTPATIENT)
Dept: FAMILY MEDICINE CLINIC | Facility: CLINIC | Age: 23
End: 2023-01-11
Payer: COMMERCIAL

## 2023-01-11 DIAGNOSIS — Z23 NEED FOR TDAP VACCINATION: Primary | ICD-10-CM

## 2023-01-11 DIAGNOSIS — Z23 NEED FOR HEPATITIS B VACCINATION: ICD-10-CM

## 2023-01-11 PROCEDURE — 90715 TDAP VACCINE 7 YRS/> IM: CPT | Performed by: FAMILY MEDICINE

## 2023-01-11 PROCEDURE — 90471 IMMUNIZATION ADMIN: CPT | Performed by: FAMILY MEDICINE

## 2023-01-11 PROCEDURE — 90472 IMMUNIZATION ADMIN EACH ADD: CPT | Performed by: FAMILY MEDICINE

## 2023-01-11 PROCEDURE — 90746 HEPB VACCINE 3 DOSE ADULT IM: CPT | Performed by: FAMILY MEDICINE

## 2023-01-26 ENCOUNTER — TELEPHONE (OUTPATIENT)
Dept: FAMILY MEDICINE CLINIC | Facility: CLINIC | Age: 23
End: 2023-01-26

## 2023-01-26 NOTE — TELEPHONE ENCOUNTER
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 KEHRER STATING THAT THE PATIENT HAS CHOSEN TO GET THE HEP B VACCINE SERIES AGAIN.    SHE WOULD LIKE IT UPLOADED TO [employer] SO SHE CAN PRINT IT OFF.    PLEASE ADVISE  [phone]

## 2023-01-30 NOTE — TELEPHONE ENCOUNTER
Please clarify what she needs in the note.  I do not understand.  Her antibody levels were low which means she was a nonconverter to her primary series.

## 2023-01-30 NOTE — TELEPHONE ENCOUNTER
PATIENT IS WANTING TO KNOW IF THE SIGNED FORM CAN BE UPLOADED TO [employer].     PLEASE CALL TO LET PATIENT KNOW.

## 2023-01-31 NOTE — TELEPHONE ENCOUNTER
Caller: Dania Nieves    Relationship: Self    Best call back number:     What is the best time to reach you:     Who are you requesting to speak with (clinical staff, provider,  specific staff member):     Do you know the name of the person who called:    What was the call regarding: PATIENT IS CALLING IN TO SEE IF THE OFFICE WILL ALLOW HER MOTHER DAQUAN NIEVES TO COME IN TO  HER HEP B FORMS AS SHE IS NOW IN [place] AND SAYS THAT DRIVE IS TOO FAR FOR HER.  SHE WANTS TO BE CALLED BACK TO DISCUSS.     Do you require a callback: YES

## 2023-03-06 ENCOUNTER — TELEPHONE (OUTPATIENT)
Dept: FAMILY MEDICINE CLINIC | Facility: CLINIC | Age: 23
End: 2023-03-06
Payer: COMMERCIAL

## 2023-03-06 NOTE — TELEPHONE ENCOUNTER
Left VM for pt, advising her that I called [employer] and her case has been closed.  [employer] stated that they didn't receive the 2nd page of the packet,  I informed [employer] that we didn't receive anything form them that they needed anything from us.  I asked what fax number they have and the number she gave me was not our fax number.      I have restarted forms and am waiting on pt signature and provider signature and I will refax and start process over

## 2023-03-06 NOTE — TELEPHONE ENCOUNTER
PATIENT CALLED TO CHECK ON GETTING NEXPLANON INSERTION SCHEDULED. [facility] NEXPLANON WAS NOT IN THE CABINET. PLEASE FOLLOW UP.

## 2023-03-29 ENCOUNTER — OFFICE VISIT (OUTPATIENT)
Dept: FAMILY MEDICINE CLINIC | Facility: CLINIC | Age: 23
End: 2023-03-29
Payer: COMMERCIAL

## 2023-03-29 VITALS
HEIGHT: 65 IN | DIASTOLIC BLOOD PRESSURE: 60 MMHG | SYSTOLIC BLOOD PRESSURE: 114 MMHG | OXYGEN SATURATION: 100 % | WEIGHT: 146.8 LBS | TEMPERATURE: 97.1 F | BODY MASS INDEX: 24.46 KG/M2 | HEART RATE: 74 BPM

## 2023-03-29 DIAGNOSIS — M54.40 ACUTE MIDLINE LOW BACK PAIN WITH SCIATICA, SCIATICA LATERALITY UNSPECIFIED: ICD-10-CM

## 2023-03-29 DIAGNOSIS — Z30.09 OTHER GENERAL COUNSELING AND ADVICE FOR CONTRACEPTIVE MANAGEMENT: Primary | ICD-10-CM

## 2023-03-29 PROCEDURE — 99213 OFFICE O/P EST LOW 20 MIN: CPT | Performed by: FAMILY MEDICINE

## 2023-03-29 RX ORDER — ETONOGESTREL AND ETHINYL ESTRADIOL 11.7; 2.7 MG/1; MG/1
INSERT, EXTENDED RELEASE VAGINAL
Qty: 3 EACH | Refills: 3 | Status: SHIPPED | OUTPATIENT
Start: 2023-03-29

## 2023-03-29 NOTE — PROGRESS NOTES
"Chief Complaint  Back Pain and painful intercourse (Stopped birth control depo /No more pain )    Subjective        Dania Holliday presents to [facility]  History of Present Illness  Patient presents for couple concerns today.  First of all this back pain.  It has been going on for months.  It comes and goes.  She denies any radiation of pain except to her right hip and sacroiliac region.  There is no numbness or tingling.  She does exercise regularly.  She does not notice that things such as running makes it worse.    Her father is a physical therapist and recommended she get it checked out.    She is also concerned about her birth control.  She started having dyspareunia a few months ago.  She decided to go off of her Depo and since stopping it the dyspareunia has gone away.  She is open to other options.      Back Pain  This is a chronic problem. The current episode started more than 1 month ago. The problem occurs intermittently. The problem has been gradually worsening since onset. The pain is present in the sacro-iliac. The quality of the pain is described as aching and stabbing. The pain radiates to the right thigh. The pain is at a severity of 7/10. The pain is worse during the night. The symptoms are aggravated by sitting and standing. Stiffness is present at night. Associated symptoms include leg pain. Pertinent negatives include no abdominal pain, bladder incontinence, bowel incontinence, chest pain, dysuria, fever, headaches, numbness, paresis, paresthesias, pelvic pain, perianal numbness, tingling, weakness or weight loss. Risk factors include history of steroid use and poor posture.       Objective   Vital Signs:  /60   Pulse 74   Temp 97.1 °F (36.2 °C)   Ht 165.1 cm (65\")   Wt 66.6 kg (146 lb 12.8 oz)   SpO2 100%   BMI 24.43 kg/m²   Estimated body mass index is 24.43 kg/m² as calculated from the following:    Height as of this encounter: 165.1 cm (65\").    " Weight as of this encounter: 66.6 kg (146 lb 12.8 oz).       BMI is within normal parameters. No other follow-up for BMI required.      Physical Exam   Result Review :                   Assessment and Plan   Diagnoses and all orders for this visit:    1. Other general counseling and advice for contraceptive management (Primary)  -     etonogestrel-ethinyl estradiol (NuvaRing) 0.12-0.015 MG/24HR vaginal ring; Insert vaginally and leave in place for 3 consecutive weeks, then remove for 1 week.  Dispense: 3 each; Refill: 3    2. Acute midline low back pain with sciatica, sciatica laterality unspecified  -     XR Spine Lumbar 2 or 3 View; Future    Contraceptive management-reassured patient the dyspareunia could have been from ovarian cyst since she was on Depo, will do NuvaRing to do low-dose combined contraceptive, follow-up this summer for physical with Pap  Midline back pain-we will check an x-ray before further recommendations, patient given handout of exercises         Follow Up   Return in about 6 months (around 9/29/2023) for Annual physical with pap.  Patient was given instructions and counseling regarding her condition or for health maintenance advice. Please see specific information pulled into the AVS if appropriate.       Answers for HPI/ROS submitted by the patient on 3/27/2023  What is the primary reason for your visit?: Back Pain

## 2023-03-30 RX ORDER — ESCITALOPRAM OXALATE 10 MG/1
10 TABLET ORAL DAILY
Qty: 90 TABLET | Refills: 0 | Status: SHIPPED | OUTPATIENT
Start: 2023-03-30

## 2023-03-31 ENCOUNTER — TELEPHONE (OUTPATIENT)
Dept: FAMILY MEDICINE CLINIC | Facility: CLINIC | Age: 23
End: 2023-03-31
Payer: COMMERCIAL

## 2023-03-31 DIAGNOSIS — Z30.09 OTHER GENERAL COUNSELING AND ADVICE FOR CONTRACEPTIVE MANAGEMENT: ICD-10-CM

## 2023-04-03 DIAGNOSIS — M54.40 ACUTE MIDLINE LOW BACK PAIN WITH SCIATICA, SCIATICA LATERALITY UNSPECIFIED: Primary | ICD-10-CM

## 2023-05-10 ENCOUNTER — OFFICE VISIT (OUTPATIENT)
Dept: FAMILY MEDICINE CLINIC | Facility: CLINIC | Age: 23
End: 2023-05-10
Payer: COMMERCIAL

## 2023-05-10 VITALS
HEART RATE: 72 BPM | DIASTOLIC BLOOD PRESSURE: 70 MMHG | WEIGHT: 145.6 LBS | BODY MASS INDEX: 24.26 KG/M2 | SYSTOLIC BLOOD PRESSURE: 108 MMHG | OXYGEN SATURATION: 99 % | TEMPERATURE: 97 F | HEIGHT: 65 IN

## 2023-05-10 DIAGNOSIS — N94.10 DYSPAREUNIA IN FEMALE: Primary | ICD-10-CM

## 2023-05-10 DIAGNOSIS — Z78.9 ORAL CONTRACEPTIVE INTOLERANCE: ICD-10-CM

## 2023-05-10 PROCEDURE — 99213 OFFICE O/P EST LOW 20 MIN: CPT | Performed by: FAMILY MEDICINE

## 2023-05-10 RX ORDER — ESCITALOPRAM OXALATE 10 MG/1
10 TABLET ORAL DAILY
Qty: 90 TABLET | Refills: 1 | Status: SHIPPED | OUTPATIENT
Start: 2023-05-10

## 2023-05-10 NOTE — PROGRESS NOTES
"Chief Complaint  discuss birth control    Subjective        Dania Holliday presents to [facility]  History of Present Illness  Patient here to discuss birth control.  Mood really declined on the Nuvaring so she stopped it.  Felt better 3 days after taking it out.  She also endorses worse dyspareunia when she is on hormonal contraceptives.  They are currently using condoms carefully.  She has been doing well with her Lexapro.      Objective   Vital Signs:  /70   Pulse 72   Temp 97 °F (36.1 °C)   Ht 165.1 cm (65\")   Wt 66 kg (145 lb 9.6 oz)   SpO2 99%   BMI 24.23 kg/m²   Estimated body mass index is 24.23 kg/m² as calculated from the following:    Height as of this encounter: 165.1 cm (65\").    Weight as of this encounter: 66 kg (145 lb 9.6 oz).       BMI is within normal parameters. No other follow-up for BMI required.      Physical Exam  Constitutional:       General: She is not in acute distress.     Appearance: Normal appearance. She is well-developed.   HENT:      Head: Normocephalic and atraumatic.      Right Ear: External ear normal.      Left Ear: External ear normal.   Eyes:      Conjunctiva/sclera: Conjunctivae normal.      Pupils: Pupils are equal, round, and reactive to light.   Neck:      Thyroid: No thyromegaly.   Pulmonary:      Effort: Pulmonary effort is normal.   Neurological:      Mental Status: She is alert and oriented to person, place, and time.   Psychiatric:         Mood and Affect: Mood normal.         Behavior: Behavior normal.        Result Review :                   Assessment and Plan   Diagnoses and all orders for this visit:    1. Dyspareunia in female (Primary)  -     Ambulatory Referral to Gynecology    2. Oral contraceptive intolerance  -     Ambulatory Referral to Gynecology    Other orders  -     escitalopram (LEXAPRO) 10 MG tablet; Take 1 tablet by mouth Daily.  Dispense: 90 tablet; Refill: 1    With dyspareunia and multiple failed " contraceptives, we will get her in to see gynecology at this point for consultation.  Her mood is doing well on the Lexapro, refill sent in and she should Schedule a physical sometime this summer.         Follow Up   No follow-ups on file.  Patient was given instructions and counseling regarding her condition or for health maintenance advice. Please see specific information pulled into the AVS if appropriate.       Answers for HPI/ROS submitted by the patient on 5/3/2023  Please describe your symptoms.: The NuvaRing caused some pretty severe depression and also more pain during sex. I’m coming in to talk about my options from here.  Have you had these symptoms before?: No  How long have you been having these symptoms?: Greater than 2 weeks  What is the primary reason for your visit?: Other

## 2023-05-11 ENCOUNTER — TELEPHONE (OUTPATIENT)
Dept: FAMILY MEDICINE CLINIC | Facility: CLINIC | Age: 23
End: 2023-05-11
Payer: COMMERCIAL

## 2024-01-04 ENCOUNTER — OFFICE VISIT (OUTPATIENT)
Dept: OBSTETRICS AND GYNECOLOGY | Age: 24
End: 2024-01-04
Payer: COMMERCIAL

## 2024-01-04 VITALS
HEIGHT: 65 IN | SYSTOLIC BLOOD PRESSURE: 100 MMHG | BODY MASS INDEX: 24.83 KG/M2 | DIASTOLIC BLOOD PRESSURE: 60 MMHG | WEIGHT: 149 LBS

## 2024-01-04 DIAGNOSIS — Z11.51 SCREENING FOR HUMAN PAPILLOMAVIRUS (HPV): ICD-10-CM

## 2024-01-04 DIAGNOSIS — Z12.4 SCREENING FOR MALIGNANT NEOPLASM OF CERVIX: ICD-10-CM

## 2024-01-04 DIAGNOSIS — N94.10 FEMALE DYSPAREUNIA: ICD-10-CM

## 2024-01-04 DIAGNOSIS — Z01.419 WELL FEMALE EXAM WITH ROUTINE GYNECOLOGICAL EXAM: Primary | ICD-10-CM

## 2024-01-04 DIAGNOSIS — Z11.3 SCREEN FOR STD (SEXUALLY TRANSMITTED DISEASE): ICD-10-CM

## 2024-01-04 DIAGNOSIS — Z30.09 BIRTH CONTROL COUNSELING: ICD-10-CM

## 2024-01-04 NOTE — PROGRESS NOTES
[facility]   Obstetrics and Gynecology     2024    Patient: Dania Holliday          MR#:[id number]    History of Present Illness    Chief Complaint   Patient presents with    Gynecologic Exam     New gyn, first pap?, birth control consult, pt states she has tried the depo and the nuvaring and both have caused pain with intercourse       23 y.o. female  who presents for annual exam.  She had pain with sex with nuvaring and depo  Had depression with nuvaring  Dysparuenia for 1 year same partner  Works at [employer] in [place] this is also where she lives  In town for the holidays with her parents  Thinking about paragard         Relevant data reviewed:    Patient's last menstrual period was 2023 (exact date).  Obstetric History:  OB History          0    Para   0    Term   0       0    AB   0    Living   0         SAB   0    IAB   0    Ectopic   0    Molar   0    Multiple   0    Live Births   0               Menstrual History:     Patient's last menstrual period was 2023 (exact date).       Social History     Substance and Sexual Activity   Sexual Activity Yes    Partners: Male    Birth control/protection: Condom    Comment: have used depo and a ring, both caused pain during sex     ______________________________________  Patient Active Problem List   Diagnosis    Generalized anxiety disorder    Adjustment disorder with depressed mood    Urticaria     Past Medical History:   Diagnosis Date    Abscess     HIstory of     Anxiety     Consumes a vegan diet     On Vegan or strict vegeterian diet--Abstracted from [facility]    Depression 2019    developed after untreated anxiety    History of attention deficit disorder     History of cardiac arrhythmia     History of ear infections     Iron deficiency anemia     Underweight     History of     Urinary tract infection early 2023    resolved after antibiotics     History reviewed. No pertinent surgical  "history.  Social History     Tobacco Use   Smoking Status Never   Smokeless Tobacco Never   Tobacco Comments    i have a lot of friends that smoke. only occassionaly have a puff or two maybe once a month     Family History   Problem Relation Age of Onset    ADD / ADHD Mother     Migraines Mother     ADD / ADHD Father     Other Father         ADHD    Anxiety disorder Sister     Migraines Sister     No Known Problems Other      Prior to Admission medications    Medication Sig Start Date End Date Taking? Authorizing Provider   escitalopram (LEXAPRO) 10 MG tablet Take 1 tablet by mouth Daily. 5/10/23  Yes Kehrer, Meredith Lea, MD   etonogestrel-ethinyl estradiol (NuvaRing) 0.12-0.015 MG/24HR vaginal ring Insert vaginally and leave in place for 3 consecutive weeks, then remove for 1 week. 3/29/23 1/4/24  Kehrer, Meredith Lea, MD     _______________________________________    Current contraception: condoms  History of abnormal Pap smear: no  Family history of uterine or ovarian cancer: no  Family History of colon cancer/colon polyps: no  Family History of Breast Cancer:no  History of abnormal mammogram: no  History of abnormal lipids: no  Gardasil vaccine: yes - 2019  Vit D and Calcium:  no      The following portions of the patient's history were reviewed and updated as appropriate: allergies, current medications, past family history, past medical history, past social history, past surgical history, and problem list.    Review of Systems    Pertinent items are noted in HPI.       Objective   Physical Exam    /60   Ht 165.1 cm (65\")   Wt 67.6 kg (149 lb)   LMP 12/19/2023 (Exact Date)   BMI 24.79 kg/m²    BP Readings from Last 3 Encounters:   01/04/24 100/60   05/10/23 108/70   03/29/23 114/60      Wt Readings from Last 3 Encounters:   01/04/24 67.6 kg (149 lb)   05/10/23 66 kg (145 lb 9.6 oz)   03/29/23 66.6 kg (146 lb 12.8 oz)        BMI: Estimated body mass index is 24.79 kg/m² as calculated from the " "following:    Height as of this encounter: 165.1 cm (65\").    Weight as of this encounter: 67.6 kg (149 lb).       General: alert, appears stated age, and cooperative   Heart: regular rate and rhythm, S1, S2 normal, no murmur, click, rub or gallop   Lungs: clear to auscultation bilaterally   Abdomen: soft, non-tender, without masses, no organomegaly   Breast: inspection negative, no nipple discharge or bleeding, no masses or nodularity palpable   External genitalia/Vulva: External genitalia including bartholin's glands, Urethra, [place]'s gland and urethra meatus are normal, Perineum, rectum and anus appear normal , and Bladder appears normal without significant prolapse    Vagina: normal mucosa, normal discharge tenderness noted on exam at introitus    Cervix: no lesions   Uterus: normal size and non-tender   Adnexa: normal adnexa     As part of wellness and prevention, the following topics were discussed with the patient:  Encouraged self breast exam  Physical activity and regular exercised encouraged.         Problem List   Meds  History  Prep for Surg   Imagin}    Assessment:  Diagnoses and all orders for this visit:    1. Well female exam with routine gynecological exam (Primary)    2. Screening for human papillomavirus (HPV)  -     IGP, Rfx Aptima HPV ASCU    3. Screening for malignant neoplasm of cervix  -     IGP, Rfx Aptima HPV ASCU    4. Screen for STD (sexually transmitted disease)  -     NuSwab VG+ - Swab, Vagina    5. Birth control counseling    6. Female dyspareunia  -     Ambulatory Referral to Physical Therapy Pelvic Floor      Plan: will send off nuswab and pap today discussed dyspareunia plan TVUS for etiology and referral to pelvic floor therapy consider vulvodynia discussed this is sometimes treated with lidocaine she would like to wait on this for now, interested in a non hormonal option of bc, paragard and phexxi discussed if she chooses to have paragard take motrin 600-800 mg prior " continue lubrication with all IC All of the patient's questions were addressed and answered, I have encouraged her to call for today's test results if she has not received them within 10 days.  Patient is advised to call with any change in her condition or with any other questions, otherwise return in 12 months for annual examination. Encouraged vitamin D 800mg supplement and 1200mg calcium supplement over the counter incorporate 150 minutes of aerobic exercise weekly with strength training  Return in 4 weeks (on 2/1/2024) for Annual exam, Next scheduled follow up.    Dannielle Bradshaw, ADAM  1/4/2024 15:38 EST

## 2024-01-07 LAB
A VAGINAE DNA VAG QL NAA+PROBE: NORMAL SCORE
BVAB2 DNA VAG QL NAA+PROBE: NORMAL SCORE
C ALBICANS DNA VAG QL NAA+PROBE: NEGATIVE
C GLABRATA DNA VAG QL NAA+PROBE: NEGATIVE
C TRACH DNA VAG QL NAA+PROBE: NEGATIVE
MEGA1 DNA VAG QL NAA+PROBE: NORMAL SCORE
N GONORRHOEA DNA VAG QL NAA+PROBE: NEGATIVE
T VAGINALIS DNA VAG QL NAA+PROBE: NEGATIVE

## 2024-01-08 LAB
CONV .: NORMAL
CYTOLOGIST CVX/VAG CYTO: NORMAL
CYTOLOGY CVX/VAG DOC CYTO: NORMAL
CYTOLOGY CVX/VAG DOC THIN PREP: NORMAL
DX ICD CODE: NORMAL
HIV 1 & 2 AB SER-IMP: NORMAL
OTHER STN SPEC: NORMAL
STAT OF ADQ CVX/VAG CYTO-IMP: NORMAL

## 2024-01-16 ENCOUNTER — PATIENT MESSAGE (OUTPATIENT)
Dept: OBSTETRICS AND GYNECOLOGY | Age: 24
End: 2024-01-16
Payer: COMMERCIAL

## 2024-01-17 DIAGNOSIS — N94.10 FEMALE DYSPAREUNIA: Primary | ICD-10-CM

## 2024-01-17 NOTE — TELEPHONE ENCOUNTER
From: Dania Holliday  To: Dannielle Augustine  Sent: 1/16/2024 6:56 PM EST  Subject: Pelvic Pt Follow Up    Hello!  I had my session with a pelvic floor physical therapist today and after we talked for a while and i described my symptoms, she said she didn’t think my issue was pelvic floor related and said she didn’t feel like it was necessary to perform a pelvic exam. She told me it sounded like a hormonal imbalance in my tissues based on the location and quality of my pain and that she would recommend an estrogen vaginal cream, but to get a hormone panel done first. I was wondering if I could get a referral for a hormonal panel somewhere in [place] so I wouldn’t have to drive all the way back to [place] and then we could talk about a prescription during my follow up appointment. Plus, she said sometimes they want to take it on certain days of your period and I just started mine today. Let me know if you could help me with this!

## 2024-01-25 RX ORDER — ESCITALOPRAM OXALATE 10 MG/1
10 TABLET ORAL DAILY
Qty: 90 TABLET | Refills: 1 | Status: SHIPPED | OUTPATIENT
Start: 2024-01-25

## 2024-02-21 ENCOUNTER — OFFICE VISIT (OUTPATIENT)
Dept: FAMILY MEDICINE CLINIC | Facility: CLINIC | Age: 24
End: 2024-02-21
Payer: COMMERCIAL

## 2024-02-21 VITALS
OXYGEN SATURATION: 93 % | DIASTOLIC BLOOD PRESSURE: 72 MMHG | HEART RATE: 88 BPM | HEIGHT: 65 IN | SYSTOLIC BLOOD PRESSURE: 102 MMHG | BODY MASS INDEX: 24.79 KG/M2 | TEMPERATURE: 97.7 F | WEIGHT: 148.8 LBS

## 2024-02-21 DIAGNOSIS — R06.02 SHORTNESS OF BREATH: ICD-10-CM

## 2024-02-21 DIAGNOSIS — F41.1 GENERALIZED ANXIETY DISORDER: Primary | ICD-10-CM

## 2024-02-21 DIAGNOSIS — F41.0 PANIC ATTACKS: ICD-10-CM

## 2024-02-21 DIAGNOSIS — F43.21 ADJUSTMENT DISORDER WITH DEPRESSED MOOD: ICD-10-CM

## 2024-02-21 DIAGNOSIS — N94.10 DYSPAREUNIA IN FEMALE: ICD-10-CM

## 2024-02-21 PROCEDURE — 99214 OFFICE O/P EST MOD 30 MIN: CPT | Performed by: FAMILY MEDICINE

## 2024-02-21 RX ORDER — BUSPIRONE HYDROCHLORIDE 10 MG/1
TABLET ORAL
Qty: 55 TABLET | Refills: 0 | Status: SHIPPED | OUTPATIENT
Start: 2024-02-21 | End: 2024-03-22

## 2024-02-21 NOTE — PATIENT INSTRUCTIONS
For the buspirone,  Start 5 mg twice daily for 5 days,  Then 5 mg in a.m. and 10 mg in p.m. for 5 days,  Last go to 10 mg twice daily.

## 2024-02-21 NOTE — PROGRESS NOTES
"Chief Complaint  Anxiety    Subjective        Dania Holliday presents to [facility]  History of Present Illness  Patient has been doing really well with the Lexapro for her depression.  Recently, however, she has been having more anxiety.  She has had shortness of breath.  It has been getting worse.  Her therapist thought that it was may be something physical.  She denies any chest pain or palpitations.  She does not hear any wheezing.  She denies any SI HI or hopelessness.        Objective   Vital Signs:  /72   Pulse 88   Temp 97.7 °F (36.5 °C) (Temporal)   Ht 165.1 cm (65\")   Wt 67.5 kg (148 lb 12.8 oz)   SpO2 93%   BMI 24.76 kg/m²   Estimated body mass index is 24.76 kg/m² as calculated from the following:    Height as of this encounter: 165.1 cm (65\").    Weight as of this encounter: 67.5 kg (148 lb 12.8 oz).       BMI is within normal parameters. No other follow-up for BMI required.      Physical Exam  Constitutional:       General: She is not in acute distress.     Appearance: Normal appearance. She is well-developed.   HENT:      Head: Normocephalic and atraumatic.      Right Ear: Tympanic membrane, ear canal and external ear normal.      Left Ear: Tympanic membrane, ear canal and external ear normal.      Mouth/Throat:      Mouth: Mucous membranes are moist.      Pharynx: Oropharynx is clear.   Eyes:      Conjunctiva/sclera: Conjunctivae normal.      Pupils: Pupils are equal, round, and reactive to light.   Neck:      Thyroid: No thyromegaly.   Cardiovascular:      Rate and Rhythm: Normal rate and regular rhythm.      Heart sounds: No murmur heard.  Pulmonary:      Effort: Pulmonary effort is normal.      Breath sounds: Normal breath sounds. No wheezing.   Abdominal:      Palpations: Abdomen is soft.   Musculoskeletal:         General: Normal range of motion.      Cervical back: Neck supple.   Lymphadenopathy:      Cervical: No cervical adenopathy.   Skin:     " General: Skin is warm and dry.   Neurological:      Mental Status: She is alert and oriented to person, place, and time.   Psychiatric:         Mood and Affect: Mood normal.         Behavior: Behavior normal.        Result Review :                     Assessment and Plan     Diagnoses and all orders for this visit:    1. Generalized anxiety disorder (Primary)    2. Adjustment disorder with depressed mood    3. Dyspareunia in female    4. Shortness of breath  -     CBC & Differential  -     Comprehensive Metabolic Panel  -     TSH    5. Panic attacks  -     busPIRone (BUSPAR) 10 MG tablet; Take 0.5 tablets by mouth 2 (Two) Times a Day for 5 days, THEN 1 tablet 2 (Two) Times a Day for 25 days.  Dispense: 55 tablet; Refill: 0    Generalized anxiety disorder-worsening panic attacks-will start buspirone and follow-up in a month virtually  Adjustment disorder with depressed mood-doing well on Lexapro, continue current medication  Dyspareunia-make sure to follow-up with gynecologist  Shortness of breath-we will check labs to make sure it is just panic attacks, patient reassured exam is normal         Follow Up     Return in about 1 month (around 3/21/2024) for Recheck mood (virtual OK).  Patient was given instructions and counseling regarding her condition or for health maintenance advice. Please see specific information pulled into the AVS if appropriate.

## 2024-02-22 LAB
ALBUMIN SERPL-MCNC: 4.9 G/DL (ref 4–5)
ALBUMIN/GLOB SERPL: 1.8 {RATIO} (ref 1.2–2.2)
ALP SERPL-CCNC: 104 IU/L (ref 44–121)
ALT SERPL-CCNC: 12 IU/L (ref 0–32)
AST SERPL-CCNC: 17 IU/L (ref 0–40)
BASOPHILS # BLD AUTO: 0 X10E3/UL (ref 0–0.2)
BASOPHILS NFR BLD AUTO: 0 %
BILIRUB SERPL-MCNC: 0.2 MG/DL (ref 0–1.2)
BUN SERPL-MCNC: 8 MG/DL (ref 6–20)
BUN/CREAT SERPL: 11 (ref 9–23)
CALCIUM SERPL-MCNC: 10.3 MG/DL (ref 8.7–10.2)
CHLORIDE SERPL-SCNC: 100 MMOL/L (ref 96–106)
CO2 SERPL-SCNC: 25 MMOL/L (ref 20–29)
CREAT SERPL-MCNC: 0.74 MG/DL (ref 0.57–1)
EGFRCR SERPLBLD CKD-EPI 2021: 117 ML/MIN/1.73
EOSINOPHIL # BLD AUTO: 0 X10E3/UL (ref 0–0.4)
EOSINOPHIL NFR BLD AUTO: 0 %
ERYTHROCYTE [DISTWIDTH] IN BLOOD BY AUTOMATED COUNT: 11.8 % (ref 11.7–15.4)
GLOBULIN SER CALC-MCNC: 2.7 G/DL (ref 1.5–4.5)
GLUCOSE SERPL-MCNC: 97 MG/DL (ref 70–99)
HCT VFR BLD AUTO: 45 % (ref 34–46.6)
HGB BLD-MCNC: 15.1 G/DL (ref 11.1–15.9)
IMM GRANULOCYTES # BLD AUTO: 0 X10E3/UL (ref 0–0.1)
IMM GRANULOCYTES NFR BLD AUTO: 0 %
LYMPHOCYTES # BLD AUTO: 1.7 X10E3/UL (ref 0.7–3.1)
LYMPHOCYTES NFR BLD AUTO: 17 %
MCH RBC QN AUTO: 30.6 PG (ref 26.6–33)
MCHC RBC AUTO-ENTMCNC: 33.6 G/DL (ref 31.5–35.7)
MCV RBC AUTO: 91 FL (ref 79–97)
MONOCYTES # BLD AUTO: 0.5 X10E3/UL (ref 0.1–0.9)
MONOCYTES NFR BLD AUTO: 5 %
NEUTROPHILS # BLD AUTO: 7.5 X10E3/UL (ref 1.4–7)
NEUTROPHILS NFR BLD AUTO: 78 %
PLATELET # BLD AUTO: 295 X10E3/UL (ref 150–450)
POTASSIUM SERPL-SCNC: 4.5 MMOL/L (ref 3.5–5.2)
PROT SERPL-MCNC: 7.6 G/DL (ref 6–8.5)
RBC # BLD AUTO: 4.93 X10E6/UL (ref 3.77–5.28)
SODIUM SERPL-SCNC: 141 MMOL/L (ref 134–144)
TSH SERPL DL<=0.005 MIU/L-ACNC: 0.8 UIU/ML (ref 0.45–4.5)
WBC # BLD AUTO: 9.8 X10E3/UL (ref 3.4–10.8)

## 2024-03-06 ENCOUNTER — OFFICE VISIT (OUTPATIENT)
Dept: FAMILY MEDICINE CLINIC | Facility: CLINIC | Age: 24
End: 2024-03-06
Payer: COMMERCIAL

## 2024-03-06 VITALS
HEART RATE: 91 BPM | HEIGHT: 65 IN | SYSTOLIC BLOOD PRESSURE: 100 MMHG | BODY MASS INDEX: 24.86 KG/M2 | TEMPERATURE: 97.2 F | WEIGHT: 149.2 LBS | DIASTOLIC BLOOD PRESSURE: 68 MMHG | OXYGEN SATURATION: 98 %

## 2024-03-06 DIAGNOSIS — F41.0 PANIC ATTACKS: ICD-10-CM

## 2024-03-06 DIAGNOSIS — Z00.00 ROUTINE HEALTH MAINTENANCE: Primary | ICD-10-CM

## 2024-03-06 DIAGNOSIS — E83.52 HYPERCALCEMIA: ICD-10-CM

## 2024-03-06 DIAGNOSIS — F41.1 GENERALIZED ANXIETY DISORDER: ICD-10-CM

## 2024-03-06 PROCEDURE — 99395 PREV VISIT EST AGE 18-39: CPT | Performed by: FAMILY MEDICINE

## 2024-03-06 NOTE — PROGRESS NOTES
Subjective   Dania Holliday is a 23 y.o. female who presents for annual female wellness exam.  Chief Complaint   Patient presents with    Annual Exam   Patient presents for annual.  She had to cancel her pelvic ultrasound so she still needs to follow-up with gynecologist for dyspareunia.  She is been compliant with her buspirone with no complaints.  Has only had pone panic attack since her last visit.  She denies any drug side effects.  The only thing that came back abnormal and her labs at her last visit was an elevated calcium.  She had been taking calcium at her GYNs recommendation.    Menstrual History: regular  Pregnancy History: G0  Sexual History: not now because of pain, due for follow up  Contraception: Condoms  Hormone Replacement Therapy: na  Diet: lacto-ovo vegetarian with limited meat  Exercise: not enough    Mammogram: na  Pap Smear: 1/4/2024  Bone Density: na  Colon Cancer Screening: na    Immunization History   Administered Date(s) Administered    COVID-19 (PFIZER) Purple Cap Monovalent 01/07/2021, 01/28/2021, 12/22/2021    DTaP 5 01/15/2002, 02/26/2002, 04/23/2002, 10/29/2003, 11/08/2005    Flu Vaccine Split Quad 11/30/2019    Fluzone (or Fluarix & Flulaval for VFC) >6mos 11/30/2019, 09/27/2023    Hep A, 2 Dose 01/29/2018, 09/06/2018    Hep B, Adolescent or Pediatric 2001, 2001, 08/20/2002    Hepatitis B 01/11/2023    HiB 01/15/2002, 02/26/2002, 04/23/2002, 10/29/2003    Hpv9 08/05/2021, 06/10/2022    IPV 01/15/2002, 02/26/2002, 01/28/2003, 11/05/2005    Influenza Injectable Mdck Pf Quad 12/30/2022    Influenza, Unspecified 12/30/2022, 12/31/2022    MMR 01/28/2003, 11/08/2005    Meningococcal B,(Bexsero) 08/17/2020    Meningococcal MCV4P (Menactra) 10/29/2002, 09/06/2018    Tdap 10/29/2012, 01/11/2023    Varicella 10/29/2002, 05/10/2007       The following portions of the patient's history were reviewed and updated as appropriate: allergies, current medications, past family history,  past medical history, past social history, past surgical history and problem list.    Past Medical History:   Diagnosis Date    Abscess     HIstory of     Anxiety     Consumes a vegan diet     On Vegan or strict vegeterian diet--Abstracted from [facility]    Depression 12/2019    developed after untreated anxiety    History of attention deficit disorder     History of cardiac arrhythmia     History of ear infections     Iron deficiency anemia     Underweight     History of     Urinary tract infection early september 2023    resolved after antibiotics       History reviewed. No pertinent surgical history.    Family History   Problem Relation Age of Onset    ADD / ADHD Mother     Migraines Mother     ADD / ADHD Father     Other Father         ADHD    Anxiety disorder Sister     Migraines Sister     No Known Problems Other        Social History     Socioeconomic History    Marital status: Single   Tobacco Use    Smoking status: Never    Smokeless tobacco: Never    Tobacco comments:     i have a lot of friends that smoke. only occassionaly have a puff or two maybe once a month   Vaping Use    Vaping status: Never Used   Substance and Sexual Activity    Alcohol use: Yes     Alcohol/week: 4.0 standard drinks of alcohol     Types: 4 Drinks containing 0.5 oz of alcohol per week     Comment: usually 2 drinks per week with 1.5-2 oz of alcohol    Drug use: Never    Sexual activity: Yes     Partners: Male     Birth control/protection: Condom     Comment: have used depo and a ring, both caused pain during sex         Current Outpatient Medications:     busPIRone (BUSPAR) 10 MG tablet, Take 0.5 tablets by mouth 2 (Two) Times a Day for 5 days, THEN 1 tablet 2 (Two) Times a Day for 25 days., Disp: 55 tablet, Rfl: 0    escitalopram (LEXAPRO) 10 MG tablet, TAKE 1 TABLET BY MOUTH EVERY DAY, Disp: 90 tablet, Rfl: 1    Review of Systems    Objective   Vitals:    03/06/24 1502   BP: 100/68   Pulse: 91   Temp: 97.2 °F (36.2 °C)   TempSrc:  "Temporal   SpO2: 98%   Weight: 67.7 kg (149 lb 3.2 oz)   Height: 165.1 cm (65\")     Body mass index is 24.83 kg/m².  Physical Exam  Vitals and nursing note reviewed.   Constitutional:       General: She is not in acute distress.     Appearance: Normal appearance. She is well-developed.   HENT:      Head: Normocephalic and atraumatic.      Right Ear: Tympanic membrane, ear canal and external ear normal.      Left Ear: Tympanic membrane, ear canal and external ear normal.      Nose: Nose normal.      Mouth/Throat:      Mouth: Mucous membranes are moist.      Pharynx: Oropharynx is clear. No oropharyngeal exudate or posterior oropharyngeal erythema.   Eyes:      Conjunctiva/sclera: Conjunctivae normal.      Pupils: Pupils are equal, round, and reactive to light.   Neck:      Thyroid: No thyromegaly.   Cardiovascular:      Rate and Rhythm: Normal rate and regular rhythm.      Heart sounds: No murmur heard.  Pulmonary:      Effort: Pulmonary effort is normal.      Breath sounds: Normal breath sounds. No wheezing.   Abdominal:      General: Abdomen is flat. Bowel sounds are normal. There is no distension.      Palpations: Abdomen is soft. There is no mass.      Tenderness: There is no abdominal tenderness.      Hernia: No hernia is present.   Musculoskeletal:         General: No swelling. Normal range of motion.      Cervical back: Normal range of motion and neck supple.      Right lower leg: No edema.      Left lower leg: No edema.   Lymphadenopathy:      Cervical: No cervical adenopathy.   Skin:     General: Skin is warm and dry.      Capillary Refill: Capillary refill takes less than 2 seconds.      Findings: No rash.   Neurological:      General: No focal deficit present.      Mental Status: She is alert and oriented to person, place, and time.      Cranial Nerves: No cranial nerve deficit.   Psychiatric:         Mood and Affect: Mood normal.         Behavior: Behavior normal.           Assessment & Plan   Diagnoses " and all orders for this visit:    1. Routine health maintenance (Primary)    2. Generalized anxiety disorder    3. Panic attacks    4. Hypercalcemia  -     PTH, Intact & Calcium    Generalized anxiety disorder-improved on buspirone added to Lexapro, continue current medication and keep routine follow-up  Hypercalcemia-recheck today           Discussed the importance of maintaining a healthy weight and getting regular exercise.  Educated patient on the benefits of healthy diet.  Advise follow-up annually for wellness exams.    There are no Patient Instructions on file for this visit.

## 2024-03-07 LAB
CALCIUM SERPL-MCNC: 9.4 MG/DL (ref 8.7–10.2)
INTACT PTH: NORMAL
PTH-INTACT SERPL-MCNC: 25 PG/ML (ref 15–65)

## 2024-03-16 DIAGNOSIS — F41.0 PANIC ATTACKS: ICD-10-CM

## 2024-03-18 RX ORDER — BUSPIRONE HYDROCHLORIDE 10 MG/1
10 TABLET ORAL 2 TIMES DAILY
Qty: 60 TABLET | Refills: 2 | Status: SHIPPED | OUTPATIENT
Start: 2024-03-18 | End: 2024-03-21 | Stop reason: SDUPTHER

## 2024-03-21 ENCOUNTER — TELEMEDICINE (OUTPATIENT)
Dept: FAMILY MEDICINE CLINIC | Facility: CLINIC | Age: 24
End: 2024-03-21
Payer: COMMERCIAL

## 2024-03-21 VITALS — WEIGHT: 149 LBS | HEIGHT: 65 IN | BODY MASS INDEX: 24.83 KG/M2

## 2024-03-21 DIAGNOSIS — F41.1 GENERALIZED ANXIETY DISORDER: Primary | ICD-10-CM

## 2024-03-21 DIAGNOSIS — F41.0 PANIC ATTACKS: ICD-10-CM

## 2024-03-21 PROCEDURE — 99213 OFFICE O/P EST LOW 20 MIN: CPT | Performed by: FAMILY MEDICINE

## 2024-03-21 RX ORDER — BUSPIRONE HYDROCHLORIDE 10 MG/1
10 TABLET ORAL 3 TIMES DAILY
Qty: 90 TABLET | Refills: 1 | Status: SHIPPED | OUTPATIENT
Start: 2024-03-21

## 2024-03-21 NOTE — PROGRESS NOTES
"Chief Complaint  MEDICATION FOLLOW UP     Subjective           Dania Holliday presents to [facility]  History of Present Illness  Patient presents for virtual visit to follow-up on her anxiety.  When I last saw her for her physical she had been doing better on the addition of buspirone to her Lexapro.  No panic attacks.  Does have some increased anxiety but she has control over it.  No bad side effects.      Objective   Vital Signs:   Ht 165.1 cm (65\")   Wt 67.6 kg (149 lb)   BMI 24.79 kg/m²     Estimated body mass index is 24.79 kg/m² as calculated from the following:    Height as of this encounter: 165.1 cm (65\").    Weight as of this encounter: 67.6 kg (149 lb).     Physical Exam   Constitutional: She appears well-developed and well-nourished. No distress.   HENT:   Head: Normocephalic and atraumatic.   Eyes: Conjunctivae are normal.   Psychiatric: She has a normal mood and affect.     Result Review :                   Assessment and Plan      Diagnoses and all orders for this visit:    1. Generalized anxiety disorder (Primary)    2. Panic attacks  -     busPIRone (BUSPAR) 10 MG tablet; Take 1 tablet by mouth 3 (Three) Times a Day.  Dispense: 90 tablet; Refill: 1    Anxiety with panic attacks-doing well, continue current regimen and follow-up in a few months, will change her buspirone to 3 times daily so she has the option to take an extra dose if desired.    Follow Up     Return in about 3 months (around 6/21/2024) for Recheck mood.  Patient was given instructions and counseling regarding her condition or for health maintenance advice. Please see specific information pulled into the AVS if appropriate.     Mode of Visit: Video  Location of patient: home  Location of provider: [facility] [place] [facility]  You have chosen to receive care through a telehealth visit.  Does the patient consent to use a video/audio connection for your medical care today? Yes  The visit included audio and video " interaction. No technical issues occurred during this visit.

## 2024-04-16 DIAGNOSIS — F41.0 PANIC ATTACKS: ICD-10-CM

## 2024-04-16 RX ORDER — BUSPIRONE HYDROCHLORIDE 10 MG/1
10 TABLET ORAL 3 TIMES DAILY
Qty: 180 TABLET | Refills: 1 | Status: SHIPPED | OUTPATIENT
Start: 2024-04-16 | End: 2024-04-18 | Stop reason: SDUPTHER

## 2024-04-18 DIAGNOSIS — F41.0 PANIC ATTACKS: ICD-10-CM

## 2024-04-18 RX ORDER — BUSPIRONE HYDROCHLORIDE 10 MG/1
10 TABLET ORAL 3 TIMES DAILY
Qty: 180 TABLET | Refills: 1 | Status: SHIPPED | OUTPATIENT
Start: 2024-04-18

## 2024-04-25 ENCOUNTER — OFFICE VISIT (OUTPATIENT)
Dept: OBSTETRICS AND GYNECOLOGY | Age: 24
End: 2024-04-25
Payer: COMMERCIAL

## 2024-04-25 VITALS
WEIGHT: 148 LBS | SYSTOLIC BLOOD PRESSURE: 110 MMHG | HEIGHT: 65 IN | BODY MASS INDEX: 24.66 KG/M2 | DIASTOLIC BLOOD PRESSURE: 62 MMHG

## 2024-04-25 DIAGNOSIS — N94.819 VULVODYNIA: ICD-10-CM

## 2024-04-25 DIAGNOSIS — N94.10 FEMALE DYSPAREUNIA: Primary | ICD-10-CM

## 2024-04-25 RX ORDER — ESTRADIOL 0.1 MG/G
1 CREAM VAGINAL DAILY
Qty: 42.5 G | Refills: 0 | Status: CANCELLED | OUTPATIENT
Start: 2024-04-25

## 2024-04-25 RX ORDER — ESTRADIOL 0.1 MG/G
1 CREAM VAGINAL DAILY
Qty: 42.5 G | Refills: 0 | Status: SHIPPED | OUTPATIENT
Start: 2024-04-25

## 2024-04-25 NOTE — PROGRESS NOTES
"Subjective     Chief Complaint   Patient presents with    Follow-up     TVUS for dyspareunia        aDnia Holliday is a 23 y.o.  whose LMP is Patient's last menstrual period was 2024 (within days). presents with follow up  She was seen with ongoing dyspareunia   Saw Jarne in  and was told this sounded  more like she needed estrogen  She had labs collected that were lower end of normal  Previous exam she had burning and pain at the introitus   Same partner   Has tried multiple bcm and felt like this made things worse specifically dickson and nuvaring  NIL pap and normal nuswab       The following portions of the patient's history were reviewed and updated as appropriate:vital signs, allergies, current medications, past medical history, past social history, past surgical history, and problem list      Review of Systems   Pertinent items are noted in HPI.     Objective      /62   Ht 165.1 cm (65\")   Wt 67.1 kg (148 lb)   LMP 2024 (Within Days)   BMI 24.63 kg/m²     Physical Exam    General:   alert   Heart: Not performed today   Lungs: Not performed today.   Breast: Not performed today   Neck: Not performed today   Abdomen: Not performed today   CVA: Not performed today   Pelvis: Not performed today   Extremities: Extremities normal, atraumatic, no cyanosis or edema   Neurologic: AOx3. Gait normal. Reflexes and motor strength normal and symmetric. Cranial nerves 2-12 and sensation grossly intact.   Psychiatric: Normal affect, judgement, and mood       Lab Review   Labs: NuSwab VG+ - Swab, Vagina (2024 15:42)  IGP, Rfx Aptima HPV ASCU (2024 15:40)    Imaging   Impression:    1.  Uterus: Normal size    2.  Endometrium: Normal non-menopausal thickness and 8.84 mm     3.  Myometrium: Normal homogenous texture     4.  Ovaries   Left: Normal small follicles   Right: Normal/unremarkable     Assessment & Plan     ASSESSMENT  1. Female dyspareunia    2. Vulvodynia          PLAN  1. No " orders of the defined types were placed in this encounter.      2. Medications prescribed this encounter:        New Medications Ordered This Visit   Medications    estradiol (ESTRACE VAGINAL) 0.1 MG/GM vaginal cream     Sig: Insert 1 g into the vagina Daily. Pea sized amount to vulva at bedtime     Dispense:  42.5 g     Refill:  0       3. She is interested in trying an estrogen cream   If this does not help I would recommend seeing urogyn for possible compound options  We discussed risk of estrogen and  ACHES reviewed   Apply 0.5 mg or pea sized amount at bedtime for a week and then once a week  Return in 3 months to evaluate   TVUS normal today   I spent 30 minutes caring for Dania Holliday on this date of service. This time includes time spent by me in the following activities: preparing for the visit, reviewing tests, obtaining and/or reviewing a separately obtained history, performing a medically appropriate examination and/or evaluation, counseling and educating the patient/family/caregiver, ordering medications, tests, or procedures and documenting information in the medical record.    Follow up: 3 month(s)    Dannielle Bradshaw, ADAM  4/25/2024

## 2024-05-15 DIAGNOSIS — F41.0 PANIC ATTACKS: ICD-10-CM

## 2024-05-15 RX ORDER — BUSPIRONE HYDROCHLORIDE 10 MG/1
10 TABLET ORAL 3 TIMES DAILY
Qty: 270 TABLET | Refills: 1 | Status: SHIPPED | OUTPATIENT
Start: 2024-05-15

## 2024-07-06 ENCOUNTER — PATIENT MESSAGE (OUTPATIENT)
Dept: FAMILY MEDICINE CLINIC | Facility: CLINIC | Age: 24
End: 2024-07-06
Payer: COMMERCIAL

## 2024-07-08 NOTE — TELEPHONE ENCOUNTER
From: Dania Holliday  To: Meredith Kehrer  Sent: 7/6/2024 9:39 AM EDT  Subject: Lexapro    I know we talked about tapering off my lexapro a while ago to see if it would help with some of my sexual dysfunction, and I feel ready to try that now. Just let me know how I should go about that to prevent withdrawal as much as possible.

## 2024-07-27 ENCOUNTER — PATIENT MESSAGE (OUTPATIENT)
Dept: FAMILY MEDICINE CLINIC | Facility: CLINIC | Age: 24
End: 2024-07-27
Payer: COMMERCIAL

## 2024-07-29 NOTE — TELEPHONE ENCOUNTER
From: Dania Holliday  To: Meredith Kehrer  Sent: 7/27/2024 12:52 PM EDT  Subject: Lexapro    Just wanted to give you an update, i’ve been taking 5mg lexapro since we talked and I haven’t had any negative symptoms. I had some brain zaps and anxiety for maybe a day 1-2 weeks ago but that’s it. Let me know if I should come off it completely.

## 2024-09-12 DIAGNOSIS — F41.1 GENERALIZED ANXIETY DISORDER: Primary | ICD-10-CM

## 2024-09-12 RX ORDER — ESCITALOPRAM OXALATE 10 MG/1
10 TABLET ORAL DAILY
Qty: 90 TABLET | Refills: 1 | Status: SHIPPED | OUTPATIENT
Start: 2024-09-12

## 2024-09-12 NOTE — TELEPHONE ENCOUNTER
Rx Refill Note  Requested Prescriptions     Pending Prescriptions Disp Refills    escitalopram (LEXAPRO) 10 MG tablet [Pharmacy Med Name: ESCITALOPRAM 10 MG TABLET] 90 tablet 1     Sig: TAKE 1 TABLET BY MOUTH EVERY DAY      Last office visit with prescribing clinician: 3/6/2024   Last telemedicine visit with prescribing clinician: 3/21/2024   Next office visit with prescribing clinician: Visit date not found                         Would you like a call back once the refill request has been completed: [] Yes [] No    If the office needs to give you a call back, can they leave a voicemail: [] Yes [] No    Megan Oakes MA  09/12/24, 07:19 EDT